# Patient Record
Sex: MALE | Race: WHITE | NOT HISPANIC OR LATINO
[De-identification: names, ages, dates, MRNs, and addresses within clinical notes are randomized per-mention and may not be internally consistent; named-entity substitution may affect disease eponyms.]

---

## 2017-03-21 PROBLEM — Z87.898 H/O URINARY FREQUENCY: Status: RESOLVED | Noted: 2017-03-21 | Resolved: 2017-03-21

## 2017-03-21 PROBLEM — R06.09 DOE (DYSPNEA ON EXERTION): Status: RESOLVED | Noted: 2017-03-21 | Resolved: 2017-03-21

## 2017-03-21 PROBLEM — Z87.39 HISTORY OF BACKACHE: Status: RESOLVED | Noted: 2017-03-21 | Resolved: 2017-03-21

## 2017-03-27 ENCOUNTER — NON-APPOINTMENT (OUTPATIENT)
Age: 67
End: 2017-03-27

## 2017-03-27 ENCOUNTER — APPOINTMENT (OUTPATIENT)
Dept: INTERNAL MEDICINE | Facility: CLINIC | Age: 67
End: 2017-03-27

## 2017-03-27 VITALS — HEIGHT: 69.5 IN | WEIGHT: 200 LBS | BODY MASS INDEX: 28.96 KG/M2

## 2017-03-27 DIAGNOSIS — Z87.39 PERSONAL HISTORY OF OTHER DISEASES OF THE MUSCULOSKELETAL SYSTEM AND CONNECTIVE TISSUE: ICD-10-CM

## 2017-03-27 DIAGNOSIS — R06.09 OTHER FORMS OF DYSPNEA: ICD-10-CM

## 2017-03-27 DIAGNOSIS — Z87.898 PERSONAL HISTORY OF OTHER SPECIFIED CONDITIONS: ICD-10-CM

## 2017-03-27 DIAGNOSIS — Z78.9 OTHER SPECIFIED HEALTH STATUS: ICD-10-CM

## 2017-03-27 LAB
BILIRUB UR QL STRIP: NEGATIVE
CLARITY UR: CLEAR
COLLECTION METHOD: NORMAL
GLUCOSE UR-MCNC: NEGATIVE
HCG UR QL: 0.2 EU/DL
HGB UR QL STRIP.AUTO: NEGATIVE
KETONES UR-MCNC: NEGATIVE
LEUKOCYTE ESTERASE UR QL STRIP: NEGATIVE
NITRITE UR QL STRIP: NEGATIVE
PH UR STRIP: 5.5
PROT UR STRIP-MCNC: NEGATIVE
SP GR UR STRIP: 1.02

## 2018-01-17 ENCOUNTER — APPOINTMENT (OUTPATIENT)
Dept: SURGERY | Facility: CLINIC | Age: 68
End: 2018-01-17
Payer: COMMERCIAL

## 2018-01-17 VITALS
HEART RATE: 64 BPM | SYSTOLIC BLOOD PRESSURE: 143 MMHG | HEIGHT: 69.5 IN | TEMPERATURE: 97.5 F | BODY MASS INDEX: 28.96 KG/M2 | WEIGHT: 200 LBS | DIASTOLIC BLOOD PRESSURE: 86 MMHG

## 2018-01-17 PROCEDURE — 99212 OFFICE O/P EST SF 10 MIN: CPT

## 2018-02-07 ENCOUNTER — INPATIENT (INPATIENT)
Facility: HOSPITAL | Age: 68
LOS: 0 days | Discharge: HOME | End: 2018-02-08
Attending: SURGERY | Admitting: SURGERY
Payer: COMMERCIAL

## 2018-02-07 VITALS
RESPIRATION RATE: 18 BRPM | HEIGHT: 70 IN | OXYGEN SATURATION: 100 % | SYSTOLIC BLOOD PRESSURE: 133 MMHG | DIASTOLIC BLOOD PRESSURE: 83 MMHG | TEMPERATURE: 98 F | HEART RATE: 81 BPM | WEIGHT: 195.11 LBS

## 2018-02-07 DIAGNOSIS — Z98.89 OTHER SPECIFIED POSTPROCEDURAL STATES: Chronic | ICD-10-CM

## 2018-02-07 NOTE — ED ADULT TRIAGE NOTE - CHIEF COMPLAINT QUOTE
Pt c/o right sided abdominal pain. Pt states he is being sent from Dr. Murphy office, diagnosed with a right inguinal incarcerated hernia. Pt states he is due for surgery in the morning. Denies n/v.

## 2018-02-08 ENCOUNTER — TRANSCRIPTION ENCOUNTER (OUTPATIENT)
Age: 68
End: 2018-02-08

## 2018-02-08 VITALS
OXYGEN SATURATION: 98 % | HEART RATE: 89 BPM | RESPIRATION RATE: 16 BRPM | DIASTOLIC BLOOD PRESSURE: 64 MMHG | SYSTOLIC BLOOD PRESSURE: 120 MMHG

## 2018-02-08 DIAGNOSIS — K40.90 UNILATERAL INGUINAL HERNIA, WITHOUT OBSTRUCTION OR GANGRENE, NOT SPECIFIED AS RECURRENT: ICD-10-CM

## 2018-02-08 LAB
ALBUMIN SERPL ELPH-MCNC: 4.1 G/DL — SIGNIFICANT CHANGE UP (ref 3.3–5)
ALP SERPL-CCNC: 66 U/L — SIGNIFICANT CHANGE UP (ref 40–120)
ALT FLD-CCNC: 41 U/L — SIGNIFICANT CHANGE UP (ref 4–41)
APTT BLD: 32.9 SEC — SIGNIFICANT CHANGE UP (ref 27.5–37.4)
AST SERPL-CCNC: 38 U/L — SIGNIFICANT CHANGE UP (ref 4–40)
BASOPHILS # BLD AUTO: 0.04 K/UL — SIGNIFICANT CHANGE UP (ref 0–0.2)
BASOPHILS NFR BLD AUTO: 0.5 % — SIGNIFICANT CHANGE UP (ref 0–2)
BILIRUB SERPL-MCNC: 0.8 MG/DL — SIGNIFICANT CHANGE UP (ref 0.2–1.2)
BLD GP AB SCN SERPL QL: NEGATIVE — SIGNIFICANT CHANGE UP
BUN SERPL-MCNC: 17 MG/DL — SIGNIFICANT CHANGE UP (ref 7–23)
CALCIUM SERPL-MCNC: 9.2 MG/DL — SIGNIFICANT CHANGE UP (ref 8.4–10.5)
CHLORIDE SERPL-SCNC: 105 MMOL/L — SIGNIFICANT CHANGE UP (ref 98–107)
CO2 SERPL-SCNC: 30 MMOL/L — SIGNIFICANT CHANGE UP (ref 22–31)
CREAT SERPL-MCNC: 0.97 MG/DL — SIGNIFICANT CHANGE UP (ref 0.5–1.3)
EOSINOPHIL # BLD AUTO: 0.13 K/UL — SIGNIFICANT CHANGE UP (ref 0–0.5)
EOSINOPHIL NFR BLD AUTO: 1.6 % — SIGNIFICANT CHANGE UP (ref 0–6)
GLUCOSE SERPL-MCNC: 84 MG/DL — SIGNIFICANT CHANGE UP (ref 70–99)
HCT VFR BLD CALC: 41.1 % — SIGNIFICANT CHANGE UP (ref 39–50)
HGB BLD-MCNC: 12.7 G/DL — LOW (ref 13–17)
IMM GRANULOCYTES # BLD AUTO: 0.03 # — SIGNIFICANT CHANGE UP
IMM GRANULOCYTES NFR BLD AUTO: 0.4 % — SIGNIFICANT CHANGE UP (ref 0–1.5)
INR BLD: 1.26 — HIGH (ref 0.88–1.17)
LYMPHOCYTES # BLD AUTO: 2.05 K/UL — SIGNIFICANT CHANGE UP (ref 1–3.3)
LYMPHOCYTES # BLD AUTO: 25.6 % — SIGNIFICANT CHANGE UP (ref 13–44)
MCHC RBC-ENTMCNC: 26.7 PG — LOW (ref 27–34)
MCHC RBC-ENTMCNC: 30.9 % — LOW (ref 32–36)
MCV RBC AUTO: 86.5 FL — SIGNIFICANT CHANGE UP (ref 80–100)
MONOCYTES # BLD AUTO: 0.65 K/UL — SIGNIFICANT CHANGE UP (ref 0–0.9)
MONOCYTES NFR BLD AUTO: 8.1 % — SIGNIFICANT CHANGE UP (ref 2–14)
NEUTROPHILS # BLD AUTO: 5.1 K/UL — SIGNIFICANT CHANGE UP (ref 1.8–7.4)
NEUTROPHILS NFR BLD AUTO: 63.8 % — SIGNIFICANT CHANGE UP (ref 43–77)
NRBC # FLD: 0 — SIGNIFICANT CHANGE UP
PLATELET # BLD AUTO: 148 K/UL — LOW (ref 150–400)
PMV BLD: 13.4 FL — HIGH (ref 7–13)
POTASSIUM SERPL-MCNC: 4.4 MMOL/L — SIGNIFICANT CHANGE UP (ref 3.5–5.3)
POTASSIUM SERPL-SCNC: 4.4 MMOL/L — SIGNIFICANT CHANGE UP (ref 3.5–5.3)
PROT SERPL-MCNC: 6.7 G/DL — SIGNIFICANT CHANGE UP (ref 6–8.3)
PROTHROM AB SERPL-ACNC: 14 SEC — HIGH (ref 9.8–13.1)
RBC # BLD: 4.75 M/UL — SIGNIFICANT CHANGE UP (ref 4.2–5.8)
RBC # FLD: 13.9 % — SIGNIFICANT CHANGE UP (ref 10.3–14.5)
RH IG SCN BLD-IMP: POSITIVE — SIGNIFICANT CHANGE UP
RH IG SCN BLD-IMP: POSITIVE — SIGNIFICANT CHANGE UP
SODIUM SERPL-SCNC: 144 MMOL/L — SIGNIFICANT CHANGE UP (ref 135–145)
WBC # BLD: 8 K/UL — SIGNIFICANT CHANGE UP (ref 3.8–10.5)
WBC # FLD AUTO: 8 K/UL — SIGNIFICANT CHANGE UP (ref 3.8–10.5)

## 2018-02-08 PROCEDURE — 71046 X-RAY EXAM CHEST 2 VIEWS: CPT | Mod: 26

## 2018-02-08 RX ORDER — ASPIRIN/CALCIUM CARB/MAGNESIUM 324 MG
81 TABLET ORAL DAILY
Qty: 0 | Refills: 0 | Status: DISCONTINUED | OUTPATIENT
Start: 2018-02-08 | End: 2018-02-08

## 2018-02-08 RX ORDER — DOCUSATE SODIUM 100 MG
100 CAPSULE ORAL THREE TIMES A DAY
Qty: 0 | Refills: 0 | Status: DISCONTINUED | OUTPATIENT
Start: 2018-02-08 | End: 2018-02-08

## 2018-02-08 RX ORDER — HYDROMORPHONE HYDROCHLORIDE 2 MG/ML
0.5 INJECTION INTRAMUSCULAR; INTRAVENOUS; SUBCUTANEOUS
Qty: 0 | Refills: 0 | Status: DISCONTINUED | OUTPATIENT
Start: 2018-02-08 | End: 2018-02-08

## 2018-02-08 RX ORDER — LEVOTHYROXINE SODIUM 125 MCG
50 TABLET ORAL DAILY
Qty: 0 | Refills: 0 | Status: DISCONTINUED | OUTPATIENT
Start: 2018-02-08 | End: 2018-02-08

## 2018-02-08 RX ORDER — DOCUSATE SODIUM 100 MG
1 CAPSULE ORAL
Qty: 0 | Refills: 0 | DISCHARGE
Start: 2018-02-08

## 2018-02-08 RX ORDER — ATORVASTATIN CALCIUM 80 MG/1
40 TABLET, FILM COATED ORAL AT BEDTIME
Qty: 0 | Refills: 0 | Status: DISCONTINUED | OUTPATIENT
Start: 2018-02-08 | End: 2018-02-08

## 2018-02-08 RX ORDER — HEPARIN SODIUM 5000 [USP'U]/ML
5000 INJECTION INTRAVENOUS; SUBCUTANEOUS EVERY 8 HOURS
Qty: 0 | Refills: 0 | Status: DISCONTINUED | OUTPATIENT
Start: 2018-02-08 | End: 2018-02-08

## 2018-02-08 RX ORDER — ACETAMINOPHEN 500 MG
2 TABLET ORAL
Qty: 0 | Refills: 0 | DISCHARGE
Start: 2018-02-08

## 2018-02-08 RX ORDER — ONDANSETRON 8 MG/1
4 TABLET, FILM COATED ORAL ONCE
Qty: 0 | Refills: 0 | Status: DISCONTINUED | OUTPATIENT
Start: 2018-02-08 | End: 2018-02-08

## 2018-02-08 RX ORDER — TAMSULOSIN HYDROCHLORIDE 0.4 MG/1
0.4 CAPSULE ORAL AT BEDTIME
Qty: 0 | Refills: 0 | Status: DISCONTINUED | OUTPATIENT
Start: 2018-02-08 | End: 2018-02-08

## 2018-02-08 RX ORDER — PREGABALIN 225 MG/1
1000 CAPSULE ORAL DAILY
Qty: 0 | Refills: 0 | Status: DISCONTINUED | OUTPATIENT
Start: 2018-02-08 | End: 2018-02-08

## 2018-02-08 RX ORDER — OXYCODONE AND ACETAMINOPHEN 5; 325 MG/1; MG/1
1 TABLET ORAL
Qty: 30 | Refills: 0
Start: 2018-02-08

## 2018-02-08 RX ORDER — SODIUM CHLORIDE 9 MG/ML
1000 INJECTION, SOLUTION INTRAVENOUS
Qty: 0 | Refills: 0 | Status: DISCONTINUED | OUTPATIENT
Start: 2018-02-08 | End: 2018-02-08

## 2018-02-08 RX ORDER — ACETAMINOPHEN 500 MG
650 TABLET ORAL EVERY 6 HOURS
Qty: 0 | Refills: 0 | Status: DISCONTINUED | OUTPATIENT
Start: 2018-02-08 | End: 2018-02-08

## 2018-02-08 RX ADMIN — Medication 100 MILLIGRAM(S): at 06:25

## 2018-02-08 RX ADMIN — PREGABALIN 1000 MICROGRAM(S): 225 CAPSULE ORAL at 07:44

## 2018-02-08 RX ADMIN — HEPARIN SODIUM 5000 UNIT(S): 5000 INJECTION INTRAVENOUS; SUBCUTANEOUS at 06:25

## 2018-02-08 RX ADMIN — SODIUM CHLORIDE 125 MILLILITER(S): 9 INJECTION, SOLUTION INTRAVENOUS at 02:50

## 2018-02-08 RX ADMIN — Medication 50 MICROGRAM(S): at 06:25

## 2018-02-08 RX ADMIN — HYDROMORPHONE HYDROCHLORIDE 0.5 MILLIGRAM(S): 2 INJECTION INTRAMUSCULAR; INTRAVENOUS; SUBCUTANEOUS at 10:07

## 2018-02-08 NOTE — ASU DISCHARGE PLAN (ADULT/PEDIATRIC). - NURSING INSTRUCTIONS
DO NOT take any Tylenol (Acetaminophen) or narcotics containing Tylenol until after 2:30 pm. You received Tylenol during your operation and it can cause damage to your liver if too much is taken within a 24 hour time period.     No creams, lotions, powders  or ointments to incision site. Do not scrub incision while showering. Pat dry after shower.

## 2018-02-08 NOTE — H&P ADULT - NSHPLABSRESULTS_GEN_ALL_CORE
12.7   8.00  )-----------( 148      ( 08 Feb 2018 00:05 )             41.1                 PT/INR - ( 08 Feb 2018 00:05 )   PT: 14.0 SEC;   INR: 1.26          PTT - ( 08 Feb 2018 00:05 )  PTT:32.9 SEC    02-08    144  |  105  |  17  ----------------------------<  84  4.4   |  30  |  0.97    Ca    9.2      08 Feb 2018 00:05    TPro  6.7  /  Alb  4.1  /  TBili  0.8  /  DBili  x   /  AST  38  /  ALT  41  /  AlkPhos  66  02-08      LIVER FUNCTIONS - ( 08 Feb 2018 00:05 )  Alb: 4.1 g/dL / Pro: 6.7 g/dL / ALK PHOS: 66 u/L / ALT: 41 u/L / AST: 38 u/L / GGT: x                 IMAGING  < from: Xray Chest 2 Views PA/Lat (02.08.18 @ 00:53) >    INTERPRETATION:  no emergent findings    < end of copied text >    EKG: WNL, no acute findings

## 2018-02-08 NOTE — BRIEF OPERATIVE NOTE - PROCEDURE
<<-----Click on this checkbox to enter Procedure Right hydrocelectomy in adult  02/08/2018    Active  DLOMASNEY  Right inguinal hernia repair  02/08/2018    Active  DLOMASNEY

## 2018-02-08 NOTE — H&P ADULT - PMH
BPH (benign prostatic hypertrophy)    CAD (coronary artery disease)    GERD (gastroesophageal reflux disease)    HTN (hypertension)    Hypothyroidism    Leg edema    Pericarditis    Peripheral neuropathy    Pituitary adenoma  treated with meds  Prostatism    Stented coronary artery  2015  Varicose vein    Venous insufficiency

## 2018-02-08 NOTE — ED PROVIDER NOTE - MEDICAL DECISION MAKING DETAILS
67M PMH CAD (stent x2, off asa x2w), HTN, R inguinal hernia x2yrs p/w R groin pain, now resolved after  getting reduced by Dr. Murphy, sent for fruther surgical management. Currently asymptomatic. Vitals wnl, exam as above.  ddx: Hernia, clinically not strangulated.  Surgery already at bedside prior to my eval. Requesting pre-op labs, CXR/EKG and admission for likely surgical management in the morning.

## 2018-02-08 NOTE — ED ADULT NURSE NOTE - ED STAT RN HANDOFF DETAILS
Patient is Armen&Ox4, aware of plan of care and in NAD, and has ESSU assignment available.  Report given CLAY Dubon in ESSU 1.  Patient awaiting transportation.  Will continue to monitor patient closely. TANYA Barnes R.N.

## 2018-02-08 NOTE — H&P ADULT - NSHPPHYSICALEXAM_GEN_ALL_CORE
Vital Signs Last 24 Hrs  T(C): 36.9 (08 Feb 2018 01:51), Max: 36.9 (08 Feb 2018 01:51)  T(F): 98.4 (08 Feb 2018 01:51), Max: 98.4 (08 Feb 2018 01:51)  HR: 67 (08 Feb 2018 01:51) (67 - 81)  BP: 151/90 (08 Feb 2018 01:51) (133/83 - 151/90)  BP(mean): --  RR: 18 (08 Feb 2018 01:51) (18 - 18)  SpO2: 100% (08 Feb 2018 01:51) (100% - 100%)    GEN: NAD, alert and oriented x 3  HEENT: WNL  CHEST: Symmetrical chest rise, breath sounds CTAB  HEART: RRR, non-muffled heart sounds  ABD: Soft, non-tender, non-distended. R groin mass - overlying skin is without erythema/edema, non-tender, mass is soft and non-tender, and reduces proximally into inguinal canal. Testes are descended, scrotum is soft/non-tender/no erythema.  EXT: No erythema/edema. Warm, sensate, motor function intact

## 2018-02-08 NOTE — ED PROVIDER NOTE - OBJECTIVE STATEMENT
67M PMH CAD (stent x2, off asa x2w), HTN p/w R groin pain. Has hx of R inguinal hernia x2yrs, pain worsened today, went to Dr Francisco who reduced the hernia and send to ED for further eval. Pt now pain free. +Able to pass gas. Last BM in the morning. Denies f/c, SOB/CP, NVD, urinary complaints, black/bloody stool.

## 2018-02-08 NOTE — ASU DISCHARGE PLAN (ADULT/PEDIATRIC). - SPECIAL INSTRUCTIONS
- Pt should hold Effient until tomorrow  - Wear supportive underwear  - Ice for swelling 3x/ day for 15 minutes x 3 days  - Pt can expect to have bruising of the scrotum and penis, as well as possible swelling

## 2018-02-08 NOTE — ASU DISCHARGE PLAN (ADULT/PEDIATRIC). - ASU FOLLOWUP
911 or go to the nearest Emergency Room [facility] (Adult):/may also call Recovery Room (PACU) 24/7 @ [phone]

## 2018-02-08 NOTE — BRIEF OPERATIVE NOTE - PRE-OP DX
Hydrocele in adult  02/08/2018    Active  Felipe Hampton  Right inguinal hernia  02/08/2018    Active  Felipe Hampton

## 2018-02-08 NOTE — ASU DISCHARGE PLAN (ADULT/PEDIATRIC). - INSTRUCTIONS
Keep first meal light. Nothing fried, spicy or greasy. Increase fluids.  Progress to regular diet as tolerated.  Keep well hydrated. Call MD office to schedule follow up appointment

## 2018-02-08 NOTE — BRIEF OPERATIVE NOTE - CONDITION POST OP
Pt stopped at desk- pt wants to cancel appt for procedure scheduled 11/9- pt st he does not want to reschedule Stable

## 2018-02-08 NOTE — ASU DISCHARGE PLAN (ADULT/PEDIATRIC). - PAIN
[facility], Narcotic pain medication may cause nausea or constipation. Take medication with food. Increase fluids and fiber intake.

## 2018-02-08 NOTE — ED ADULT NURSE NOTE - OBJECTIVE STATEMENT
Received pt to spot 25 A&Ox4 sent in by PCP r/t RLQ pain as per pt has incarcerated hernia, PCP reduced as much as possible in office but scheduled for OR in the morning, denies N/V, denies wanting medication for pain. Iv placed, labs sent, VS as documented, will reassess.

## 2018-02-08 NOTE — H&P ADULT - HISTORY OF PRESENT ILLNESS
67y male, presenting with Right inguinal hernia which was incarcerated for approximately 12 hours on the day of admission, and was reduced in the office by the patient's surgeon (Dr. Murphy). Patient is presenting for admission for observation and 67y male, presenting with Right inguinal hernia which was incarcerated for approximately 12 hours on the day of admission, and was reduced in the office by the patient's surgeon (Dr. Murphy). Patient is presenting for admission for observation and operative repair of his right inguinal hernia. He denies fever/chills, denies nausea/vomiting, reports passage of flatus and stool. He denies groin or abdominal pain at present.   Patient has had this right inguinal hernia for roughly two years, and it has been slowly growing and his intermittent right groin pain has been slowly worsening. The hernia has never resulted in obstructive symptoms. Of note, he was in discussion to have the hernia repaired with another surgeon (prior to seeing Dr. Murphy), but his operative repair was delayed because he had recently undergone PCI with placement of JORDAN x 2 (placed separately at two cath sessions for CAD). He was on Effient (which could not be stopped), and so his surgery was delayed. He is off Effient now. He does remain on ASA 81 daily - however this was held for a recent colonoscopy. Patient has not taken ASA in roughly two weeks. Last PO was a light dinner at 17:00 on the day of admission.

## 2018-02-08 NOTE — ED PROVIDER NOTE - PHYSICAL EXAMINATION
PCA juliet chaperone: +R inguinal hernia, mostly reducible, no overlying skin changes or ttp  no LE edema, normal equal distal pulses.   no CVAT

## 2018-02-08 NOTE — H&P ADULT - PROBLEM SELECTOR PLAN 1
- Admit to Surgery for observation and operative repair  - NPO after midnight, IVF  - Pre-operative risk assessment by IM/Cardiology (Dr. Rocky Dos Santos requested to see patient)  - DVT chemo- and mechanical prophylaxis  - PRN pain control  - Serial abdominal examinations

## 2018-02-08 NOTE — ASU DISCHARGE PLAN (ADULT/PEDIATRIC). - NOTIFY
Increased Irritability or Sluggishness/Pain not relieved by Medications/Fever greater than 101/Unable to Urinate/Persistent Nausea and Vomiting/Bleeding that does not stop Increased Irritability or Sluggishness/red hot irritated skin or pussy drainage from incision/Unable to Urinate/Pain not relieved by Medications/Inability to Tolerate Liquids or Foods/Bleeding that does not stop/Fever greater than 101/Numbness, color, or temperature change to extremity/Persistent Nausea and Vomiting

## 2018-02-23 ENCOUNTER — APPOINTMENT (OUTPATIENT)
Dept: SURGERY | Facility: AMBULATORY SURGERY CENTER | Age: 68
End: 2018-02-23

## 2018-03-05 DIAGNOSIS — Z86.2 PERSONAL HISTORY OF DISEASES OF THE BLOOD AND BLOOD-FORMING ORGANS AND CERTAIN DISORDERS INVOLVING THE IMMUNE MECHANISM: ICD-10-CM

## 2018-03-05 DIAGNOSIS — Z87.898 PERSONAL HISTORY OF OTHER SPECIFIED CONDITIONS: ICD-10-CM

## 2018-04-05 PROBLEM — Z87.898 HISTORY OF EXERTIONAL CHEST PAIN: Status: RESOLVED | Noted: 2018-04-05 | Resolved: 2018-04-05

## 2018-04-05 PROBLEM — Z86.2 HISTORY OF THROMBOCYTOPENIA: Status: RESOLVED | Noted: 2018-04-05 | Resolved: 2018-04-05

## 2018-04-23 ENCOUNTER — APPOINTMENT (OUTPATIENT)
Dept: INTERNAL MEDICINE | Facility: CLINIC | Age: 68
End: 2018-04-23
Payer: COMMERCIAL

## 2018-04-23 ENCOUNTER — MEDICATION RENEWAL (OUTPATIENT)
Age: 68
End: 2018-04-23

## 2018-04-23 ENCOUNTER — NON-APPOINTMENT (OUTPATIENT)
Age: 68
End: 2018-04-23

## 2018-04-23 ENCOUNTER — LABORATORY RESULT (OUTPATIENT)
Age: 68
End: 2018-04-23

## 2018-04-23 VITALS
HEIGHT: 69.5 IN | SYSTOLIC BLOOD PRESSURE: 120 MMHG | DIASTOLIC BLOOD PRESSURE: 84 MMHG | BODY MASS INDEX: 29.1 KG/M2 | WEIGHT: 201 LBS

## 2018-04-23 VITALS — SYSTOLIC BLOOD PRESSURE: 118 MMHG | DIASTOLIC BLOOD PRESSURE: 78 MMHG

## 2018-04-23 DIAGNOSIS — K40.90 UNILATERAL INGUINAL HERNIA, W/OUT OBSTRUCTION OR GANGRENE, NOT SPECIFIED AS RECURRENT: ICD-10-CM

## 2018-04-23 LAB
BILIRUB UR QL STRIP: NORMAL
CLARITY UR: CLEAR
COLLECTION METHOD: NORMAL
GLUCOSE UR-MCNC: NORMAL
HCG UR QL: 0.2 EU/DL
HGB UR QL STRIP.AUTO: NORMAL
KETONES UR-MCNC: NORMAL
LEUKOCYTE ESTERASE UR QL STRIP: NORMAL
NITRITE UR QL STRIP: NORMAL
PH UR STRIP: 5.5
PROT UR STRIP-MCNC: NORMAL
SP GR UR STRIP: 1.02

## 2018-04-23 PROCEDURE — 36415 COLL VENOUS BLD VENIPUNCTURE: CPT

## 2018-04-23 PROCEDURE — 93000 ELECTROCARDIOGRAM COMPLETE: CPT

## 2018-04-23 PROCEDURE — 99397 PER PM REEVAL EST PAT 65+ YR: CPT | Mod: 25

## 2018-04-23 PROCEDURE — 81003 URINALYSIS AUTO W/O SCOPE: CPT | Mod: QW

## 2018-04-24 LAB
25(OH)D3 SERPL-MCNC: 38.5 NG/ML
ALBUMIN SERPL ELPH-MCNC: 3.7 G/DL
ALP BLD-CCNC: 75 U/L
ALT SERPL-CCNC: 26 U/L
ANION GAP SERPL CALC-SCNC: 13 MMOL/L
AST SERPL-CCNC: 28 U/L
BASOPHILS # BLD AUTO: 0.02 K/UL
BASOPHILS NFR BLD AUTO: 0.4 %
BILIRUB SERPL-MCNC: 0.6 MG/DL
BUN SERPL-MCNC: 19 MG/DL
CALCIUM SERPL-MCNC: 9.4 MG/DL
CHLORIDE SERPL-SCNC: 104 MMOL/L
CHOLEST SERPL-MCNC: 106 MG/DL
CHOLEST/HDLC SERPL: 2.1 RATIO
CO2 SERPL-SCNC: 25 MMOL/L
CREAT SERPL-MCNC: 1.22 MG/DL
EOSINOPHIL # BLD AUTO: 0.08 K/UL
EOSINOPHIL NFR BLD AUTO: 1.5 %
GLUCOSE SERPL-MCNC: 63 MG/DL
HBA1C MFR BLD HPLC: 5.1 %
HCT VFR BLD CALC: 37 %
HDLC SERPL-MCNC: 50 MG/DL
HGB BLD-MCNC: 11.4 G/DL
IMM GRANULOCYTES NFR BLD AUTO: 0 %
LDLC SERPL CALC-MCNC: 50 MG/DL
LYMPHOCYTES # BLD AUTO: 1.62 K/UL
LYMPHOCYTES NFR BLD AUTO: 29.5 %
MAN DIFF?: NORMAL
MCHC RBC-ENTMCNC: 26.1 PG
MCHC RBC-ENTMCNC: 30.8 GM/DL
MCV RBC AUTO: 84.7 FL
MONOCYTES # BLD AUTO: 0.37 K/UL
MONOCYTES NFR BLD AUTO: 6.7 %
NEUTROPHILS # BLD AUTO: 3.4 K/UL
NEUTROPHILS NFR BLD AUTO: 61.9 %
PLATELET # BLD AUTO: 181 K/UL
POTASSIUM SERPL-SCNC: 4.4 MMOL/L
PROT SERPL-MCNC: 7 G/DL
RBC # BLD: 4.37 M/UL
RBC # FLD: 14.5 %
SAVE SPECIMEN: NORMAL
SODIUM SERPL-SCNC: 142 MMOL/L
T3RU NFR SERPL: 1.04 INDEX
T4 SERPL-MCNC: 7.1 UG/DL
TRIGL SERPL-MCNC: 29 MG/DL
TSH SERPL-ACNC: 2.89 UIU/ML
URATE SERPL-MCNC: 3.8 MG/DL
WBC # FLD AUTO: 5.49 K/UL

## 2019-06-03 ENCOUNTER — APPOINTMENT (OUTPATIENT)
Dept: INTERNAL MEDICINE | Facility: CLINIC | Age: 69
End: 2019-06-03
Payer: COMMERCIAL

## 2019-06-03 ENCOUNTER — NON-APPOINTMENT (OUTPATIENT)
Age: 69
End: 2019-06-03

## 2019-06-03 VITALS
DIASTOLIC BLOOD PRESSURE: 70 MMHG | SYSTOLIC BLOOD PRESSURE: 120 MMHG | HEIGHT: 69.25 IN | BODY MASS INDEX: 26.65 KG/M2 | WEIGHT: 182 LBS

## 2019-06-03 VITALS — DIASTOLIC BLOOD PRESSURE: 74 MMHG | SYSTOLIC BLOOD PRESSURE: 120 MMHG

## 2019-06-03 DIAGNOSIS — R05 COUGH: ICD-10-CM

## 2019-06-03 DIAGNOSIS — R79.89 OTHER SPECIFIED ABNORMAL FINDINGS OF BLOOD CHEMISTRY: ICD-10-CM

## 2019-06-03 LAB
BILIRUB UR QL STRIP: NORMAL
CLARITY UR: CLEAR
COLLECTION METHOD: NORMAL
GLUCOSE UR-MCNC: NORMAL
HCG UR QL: 0.2 EU/DL
HGB UR QL STRIP.AUTO: NORMAL
KETONES UR-MCNC: NORMAL
LEUKOCYTE ESTERASE UR QL STRIP: NORMAL
NITRITE UR QL STRIP: NORMAL
PH UR STRIP: 5.5
PROT UR STRIP-MCNC: NORMAL
SP GR UR STRIP: 1.03

## 2019-06-03 PROCEDURE — 81003 URINALYSIS AUTO W/O SCOPE: CPT | Mod: QW

## 2019-06-03 PROCEDURE — 99397 PER PM REEVAL EST PAT 65+ YR: CPT | Mod: 25

## 2019-06-03 PROCEDURE — 93000 ELECTROCARDIOGRAM COMPLETE: CPT

## 2019-06-03 NOTE — REVIEW OF SYSTEMS
[Chest Pain] : chest pain [Fatigue] : fatigue [Nocturia] : nocturia [Joint Pain] : joint pain [Negative] : Heme/Lymph [de-identified] : rash [de-identified] : neuropathy

## 2019-06-03 NOTE — HISTORY OF PRESENT ILLNESS
[FreeTextEntry1] : This is a 69-year-old male for annual health assessment [de-identified] : Specifically we will address his history of ASHD hypertension hypothyroidism peripheral neuropathy and pituitary adenoma. In addition he has a history of tinnitus\par \par Patient has chest pain on exertion which is unchanged over the year\par \par he continues to have dysesthesias of his hands and feet which have not increased\par \par He has been complaining of fatigue he lamberto a testosterone level on himself and it is low. That is free testosterone is low total is normal but he does have elevation of gonadotropins.\par \par He has pain in his left knee.

## 2019-06-03 NOTE — ASSESSMENT
[FreeTextEntry1] : This is a 69-year-old male whose history has been reviewed above\par \par He has a history of hypertension his blood pressure is under excellent control on the current regime. He has lost 10 pounds. He has no orthostasis no medication changes\par \par He remains on atorvastatin for cardioprotection. Cholesterol profile has been obtained. His LDL is 53 no change\par \par He does have a history of hypothyroidism he remains on Synthroid his TSH is normal no intervention\par \par He does have nocturia but apparently his prostate is not enlarged as per his urologist he remains on Flomax.\par \par He is having difficulty with fatigue and sexual performance. His testosterone is low. I have no objections to starting a trial of testosterone.\par \par In terms of his neuropathy this seems to have stabilized he has been worked up appropriately\par \par He is up-to-date with colonoscopy\par \par He is up-to-date with vaccinations he will get the shingle shot\par \par In terms of his knee I asked him to obtain a Lyme's titer.\par \par We will have him seen by orthopedics\par \par In addition we will have him seen by dermatology\par \par Terms of his ASHD he is stable. He is followed by cardiology I see no need for intervention\par \par For completeness it should be noted that although he had 2 stents there was no major vessel disease His EKG was normal\par \par He is attributing his mildly low hemoglobin which has been intermittent 2 his testosterone which is reasonable but I think he should get at least a serologic workup\par \par In terms of his cough we will get a chest x-ray but I think he should start Flonase as this may be postnasal this point\par \par

## 2019-06-03 NOTE — PHYSICAL EXAM
[Well Nourished] : well nourished [No Acute Distress] : no acute distress [Well Developed] : well developed [Well-Appearing] : well-appearing [PERRL] : pupils equal round and reactive to light [Normal Sclera/Conjunctiva] : normal sclera/conjunctiva [EOMI] : extraocular movements intact [Normal Outer Ear/Nose] : the outer ears and nose were normal in appearance [Normal Oropharynx] : the oropharynx was normal [Supple] : supple [No JVD] : no jugular venous distention [Thyroid Normal, No Nodules] : the thyroid was normal and there were no nodules present [No Lymphadenopathy] : no lymphadenopathy [No Respiratory Distress] : no respiratory distress  [Clear to Auscultation] : lungs were clear to auscultation bilaterally [No Accessory Muscle Use] : no accessory muscle use [Regular Rhythm] : with a regular rhythm [Normal Rate] : normal rate  [Normal S1, S2] : normal S1 and S2 [No Murmur] : no murmur heard [No Carotid Bruits] : no carotid bruits [No Abdominal Bruit] : a ~M bruit was not heard ~T in the abdomen [Pedal Pulses Present] : the pedal pulses are present [No Varicosities] : no varicosities [No Extremity Clubbing/Cyanosis] : no extremity clubbing/cyanosis [No Edema] : there was no peripheral edema [No Palpable Aorta] : no palpable aorta [Soft] : abdomen soft [Non Tender] : non-tender [Non-distended] : non-distended [No Masses] : no abdominal mass palpated [No HSM] : no HSM [Normal Bowel Sounds] : normal bowel sounds [No Mass] : no mass [Normal Sphincter Tone] : normal sphincter tone [Normal Posterior Cervical Nodes] : no posterior cervical lymphadenopathy [Normal Anterior Cervical Nodes] : no anterior cervical lymphadenopathy [No CVA Tenderness] : no CVA  tenderness [No Spinal Tenderness] : no spinal tenderness [Grossly Normal Strength/Tone] : grossly normal strength/tone [Normal Gait] : normal gait [Deep Tendon Reflexes (DTR)] : deep tendon reflexes were 2+ and symmetric [Normal Affect] : the affect was normal [No Focal Deficits] : no focal deficits [Coordination Grossly Intact] : coordination grossly intact [Normal Insight/Judgement] : insight and judgment were intact [Stool Occult Blood] : stool negative for occult blood [de-identified] : diffusebut localized rash is [de-identified] : mildly swolleinful left knee [de-identified] : unchanged

## 2019-06-03 NOTE — HEALTH RISK ASSESSMENT
[Good] : ~his/her~  mood as  good [No falls in past year] : Patient reported no falls in the past year [0] : 1) Little interest or pleasure doing things: Not at all (0) [With Family] : lives with family [None] : None [Graduate School] : graduate school [] :  [Feels Safe at Home] : Feels safe at home [Fully functional (bathing, dressing, toileting, transferring, walking, feeding)] : Fully functional (bathing, dressing, toileting, transferring, walking, feeding) [Sexually Active] : sexually active [Fully functional (using the telephone, shopping, preparing meals, housekeeping, doing laundry, using] : Fully functional and needs no help or supervision to perform IADLs (using the telephone, shopping, preparing meals, housekeeping, doing laundry, using transportation, managing medications and managing finances) [Reports normal functional visual acuity (ie: able to read med bottle)] : Reports normal functional visual acuity [Carbon Monoxide Detector] : carbon monoxide detector [Smoke Detector] : smoke detector [Seat Belt] :  uses seat belt [Sunscreen] : uses sunscreen [I will adhere to the patient's wishes as expressed in the advance directive except as noted below.] : I will adhere to the patient's wishes as expressed in the advance directive except as noted below [Change in mental status noted] : No change in mental status noted [] : No [Reports changes in vision] : Reports no changes in vision [Reports changes in hearing] : Reports no changes in hearing [Guns at Home] : no guns at home [Reports changes in dental health] : Reports no changes in dental health [Safety elements used in home] : no safety elements used in home [Travel to Developing Areas] : does not  travel to developing areas [TB Exposure] : is not being exposed to tuberculosis [Caregiver Concerns] : does not have caregiver concerns [FreeTextEntry4] : Wife his health care proxy

## 2019-06-04 ENCOUNTER — TRANSCRIPTION ENCOUNTER (OUTPATIENT)
Age: 69
End: 2019-06-04

## 2019-06-20 ENCOUNTER — APPOINTMENT (OUTPATIENT)
Dept: ORTHOPEDIC SURGERY | Facility: CLINIC | Age: 69
End: 2019-06-20
Payer: COMMERCIAL

## 2019-06-20 VITALS
BODY MASS INDEX: 26.66 KG/M2 | DIASTOLIC BLOOD PRESSURE: 80 MMHG | SYSTOLIC BLOOD PRESSURE: 129 MMHG | HEART RATE: 68 BPM | WEIGHT: 180 LBS | HEIGHT: 69 IN

## 2019-06-20 DIAGNOSIS — M25.562 PAIN IN LEFT KNEE: ICD-10-CM

## 2019-06-20 PROCEDURE — 73562 X-RAY EXAM OF KNEE 3: CPT

## 2019-06-20 PROCEDURE — 99203 OFFICE O/P NEW LOW 30 MIN: CPT

## 2019-07-12 NOTE — ASU PREOP CHECKLIST - RESPIRATORY RATE (BREATHS/MIN)
16
Syncope    Syncope is when you temporarily lose consciousness, also called fainting or passing out. It is caused by a sudden decrease in blood flow to the brain. Even though most causes of syncope are not dangerous, syncope can possibly be a sign of a serious medical problem. Signs that you may be about to faint include feeling dizzy, lightheaded, nausea, visual changes, or cold/clammy skin. Do not drive, operate heavy machinery, or play sports until your health care provider says it is okay.    SEEK IMMEDIATE MEDICAL CARE IF YOU HAVE ANY OF THE FOLLOWING SYMPTOMS: severe headache, pain in your chest/abdomen/back, bleeding from your mouth or rectum, palpitations, shortness of breath, pain with breathing, seizure, confusion, or trouble walking.

## 2019-08-27 ENCOUNTER — LABORATORY RESULT (OUTPATIENT)
Age: 69
End: 2019-08-27

## 2019-09-04 LAB
ALBUMIN MFR SERPL ELPH: 58.9 %
ALBUMIN SERPL-MCNC: 3.7 G/DL
ALBUMIN/GLOB SERPL: 1.5 RATIO
ALPHA1 GLOB MFR SERPL ELPH: 5.2 %
ALPHA1 GLOB SERPL ELPH-MCNC: 0.3 G/DL
ALPHA2 GLOB MFR SERPL ELPH: 9.9 %
ALPHA2 GLOB SERPL ELPH-MCNC: 0.6 G/DL
B BURGDOR IGG+IGM SER QL IB: NORMAL
B-GLOBULIN MFR SERPL ELPH: 11.6 %
B-GLOBULIN SERPL ELPH-MCNC: 0.7 G/DL
FERRITIN SERPL-MCNC: 35 NG/ML
FOLATE SERPL-MCNC: >20 NG/ML
GAMMA GLOB FLD ELPH-MCNC: 0.9 G/DL
GAMMA GLOB MFR SERPL ELPH: 14.4 %
INTERPRETATION SERPL IEP-IMP: NORMAL
IRON SATN MFR SERPL: 21 %
IRON SERPL-MCNC: 74 UG/DL
M PROTEIN SPEC IFE-MCNC: NORMAL
PROT SERPL-MCNC: 6.2 G/DL
PROT SERPL-MCNC: 6.2 G/DL
SAVE SPECIMEN: NORMAL
TIBC SERPL-MCNC: 359 UG/DL
UIBC SERPL-MCNC: 285 UG/DL
VIT B12 SERPL-MCNC: >2000 PG/ML

## 2019-09-25 ENCOUNTER — APPOINTMENT (OUTPATIENT)
Dept: INTERNAL MEDICINE | Facility: CLINIC | Age: 69
End: 2019-09-25
Payer: COMMERCIAL

## 2019-09-25 PROCEDURE — 90471 IMMUNIZATION ADMIN: CPT

## 2019-09-25 PROCEDURE — 90750 HZV VACC RECOMBINANT IM: CPT

## 2019-09-26 ENCOUNTER — MED ADMIN CHARGE (OUTPATIENT)
Age: 69
End: 2019-09-26

## 2019-11-27 ENCOUNTER — APPOINTMENT (OUTPATIENT)
Dept: ORTHOPEDIC SURGERY | Facility: CLINIC | Age: 69
End: 2019-11-27
Payer: COMMERCIAL

## 2019-11-27 DIAGNOSIS — M17.12 UNILATERAL PRIMARY OSTEOARTHRITIS, LEFT KNEE: ICD-10-CM

## 2019-11-27 DIAGNOSIS — M25.562 PAIN IN LEFT KNEE: ICD-10-CM

## 2019-11-27 PROCEDURE — 99213 OFFICE O/P EST LOW 20 MIN: CPT

## 2019-12-18 ENCOUNTER — APPOINTMENT (OUTPATIENT)
Dept: INTERNAL MEDICINE | Facility: CLINIC | Age: 69
End: 2019-12-18
Payer: COMMERCIAL

## 2019-12-18 PROCEDURE — 90750 HZV VACC RECOMBINANT IM: CPT

## 2019-12-18 PROCEDURE — 90471 IMMUNIZATION ADMIN: CPT

## 2019-12-19 ENCOUNTER — MED ADMIN CHARGE (OUTPATIENT)
Age: 69
End: 2019-12-19

## 2020-03-30 ENCOUNTER — APPOINTMENT (OUTPATIENT)
Dept: INTERNAL MEDICINE | Facility: CLINIC | Age: 70
End: 2020-03-30

## 2020-05-04 ENCOUNTER — APPOINTMENT (OUTPATIENT)
Dept: DERMATOLOGY | Facility: CLINIC | Age: 70
End: 2020-05-04

## 2020-07-23 NOTE — ASU PREOP CHECKLIST - BP NONINVASIVE DIASTOLIC (MM HG)
90 Complex Repair And Split-Thickness Skin Graft Text: The defect edges were debeveled with a #15 scalpel blade.  The primary defect was closed partially with a complex linear closure.  Given the location of the defect, shape of the defect and the proximity to free margins a split thickness skin graft was deemed most appropriate to repair the remaining defect.  The graft was trimmed to fit the size of the remaining defect.  The graft was then placed in the primary defect, oriented appropriately, and sutured into place.

## 2020-08-24 ENCOUNTER — APPOINTMENT (OUTPATIENT)
Dept: INTERNAL MEDICINE | Facility: CLINIC | Age: 70
End: 2020-08-24
Payer: COMMERCIAL

## 2020-08-24 ENCOUNTER — NON-APPOINTMENT (OUTPATIENT)
Age: 70
End: 2020-08-24

## 2020-08-24 VITALS
DIASTOLIC BLOOD PRESSURE: 82 MMHG | WEIGHT: 183 LBS | SYSTOLIC BLOOD PRESSURE: 120 MMHG | BODY MASS INDEX: 27.11 KG/M2 | TEMPERATURE: 97.3 F | HEIGHT: 69 IN

## 2020-08-24 DIAGNOSIS — M85.80 OTHER SPECIFIED DISORDERS OF BONE DENSITY AND STRUCTURE, UNSPECIFIED SITE: ICD-10-CM

## 2020-08-24 PROCEDURE — 99387 INIT PM E/M NEW PAT 65+ YRS: CPT | Mod: 25

## 2020-08-24 PROCEDURE — 36415 COLL VENOUS BLD VENIPUNCTURE: CPT

## 2020-08-24 PROCEDURE — 93000 ELECTROCARDIOGRAM COMPLETE: CPT

## 2020-08-24 NOTE — HEALTH RISK ASSESSMENT
[Fair] : ~his/her~ current health as fair  [Good] : ~his/her~  mood as  good [Yes] : Yes [No falls in past year] : Patient reported no falls in the past year [0] : 1) Little interest or pleasure doing things: Not at all (0) [With Significant Other] : lives with significant other [None] : None [Graduate School] : graduate school [Sexually Active] : sexually active [] :  [Fully functional (using the telephone, shopping, preparing meals, housekeeping, doing laundry, using] : Fully functional and needs no help or supervision to perform IADLs (using the telephone, shopping, preparing meals, housekeeping, doing laundry, using transportation, managing medications and managing finances) [Fully functional (bathing, dressing, toileting, transferring, walking, feeding)] : Fully functional (bathing, dressing, toileting, transferring, walking, feeding) [Smoke Detector] : smoke detector [Seat Belt] :  uses seat belt [Sunscreen] : uses sunscreen [I will adhere to the patient's wishes as expressed in the advance directive except as noted below.] : I will adhere to the patient's wishes as expressed in the advance directive except as noted below [Change in mental status noted] : No change in mental status noted [Reports changes in hearing] : Reports no changes in hearing [Reports changes in vision] : Reports no changes in vision [Reports changes in dental health] : Reports no changes in dental health [Guns at Home] : no guns at home [Safety elements used in home] : no safety elements used in home [Travel to Developing Areas] : does not  travel to developing areas [TB Exposure] : is not being exposed to tuberculosis [Caregiver Concerns] : does not have caregiver concerns [FreeTextEntry4] : Wife as health care proxy

## 2020-08-24 NOTE — HISTORY OF PRESENT ILLNESS
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 His neuropathy seems to be progressing slowly he has some difficulty with fine motor tasks with the mildly tender in a handwriting\par \par He continues to have a rash on his abdomen which has not been evaluated at this time\par \par He has no cardiovascular complaints\par \par He does have chronic constipation\par \par He was noted to have mild iron deficiency anemia\par \par He has had no cardiovascular complaints\par \par He does have mild balance problems with no falls\par \par He was also noted to be osteopenic on a bone density done for loss of height

## 2020-08-24 NOTE — PHYSICAL EXAM
[No Acute Distress] : no acute distress [Well Nourished] : well nourished [Well Developed] : well developed [Well-Appearing] : well-appearing [Normal Sclera/Conjunctiva] : normal sclera/conjunctiva [PERRL] : pupils equal round and reactive to light [Normal Outer Ear/Nose] : the outer ears and nose were normal in appearance [EOMI] : extraocular movements intact [No JVD] : no jugular venous distention [Normal Oropharynx] : the oropharynx was normal [No Lymphadenopathy] : no lymphadenopathy [Supple] : supple [Thyroid Normal, No Nodules] : the thyroid was normal and there were no nodules present [No Respiratory Distress] : no respiratory distress  [No Accessory Muscle Use] : no accessory muscle use [Clear to Auscultation] : lungs were clear to auscultation bilaterally [Normal Rate] : normal rate  [Normal S1, S2] : normal S1 and S2 [Regular Rhythm] : with a regular rhythm [No Murmur] : no murmur heard [No Carotid Bruits] : no carotid bruits [No Abdominal Bruit] : a ~M bruit was not heard ~T in the abdomen [No Varicosities] : no varicosities [Pedal Pulses Present] : the pedal pulses are present [No Edema] : there was no peripheral edema [No Palpable Aorta] : no palpable aorta [No Extremity Clubbing/Cyanosis] : no extremity clubbing/cyanosis [Soft] : abdomen soft [Non-distended] : non-distended [Non Tender] : non-tender [No Masses] : no abdominal mass palpated [No HSM] : no HSM [Normal Bowel Sounds] : normal bowel sounds [Normal Anterior Cervical Nodes] : no anterior cervical lymphadenopathy [No CVA Tenderness] : no CVA  tenderness [Normal Posterior Cervical Nodes] : no posterior cervical lymphadenopathy [No Spinal Tenderness] : no spinal tenderness [No Joint Swelling] : no joint swelling [Grossly Normal Strength/Tone] : grossly normal strength/tone [No Focal Deficits] : no focal deficits [No Rash] : no rash [Normal Gait] : normal gait [Normal Insight/Judgement] : insight and judgment were intact [Normal Affect] : the affect was normal [Normal] : affect was normal and insight and judgment were intact [de-identified] : hydrocele [de-identified] : difficulty with heel-to-toe and with fine motor movement

## 2020-08-24 NOTE — REVIEW OF SYSTEMS
[Fatigue] : fatigue [Constipation] : constipation [Frequency] : frequency [Negative] : Heme/Lymph [FreeTextEntry4] : tinnitus [de-identified] : rash [de-identified] : history

## 2020-08-24 NOTE — ASSESSMENT
[FreeTextEntry1] : This is a 70-year-old male whose history has been reviewed above\par \par He has a history of ASHD he has pain-free. His EKG is normal his LDL is 45 he remains on aspirin no intervention\par \par He has a pituitary adenoma he has not visualized this in several years he will get an MRI\par \par He remains on a D 2 agonist his prolactin levels are low. Interestingly his FSH is high. He has intermittently low testosterone which may be responsible for his osteopenia. In addition his LH level is high\par \par He has seen numerous neurologists for his neuropathy with no diagnosis he did do heavy metals which were normal at this point we will send him for physical therapy for strengthening and coordination\par \par In terms of his anemia he should have a GI workup however she did have a recent colonoscopy and he is on aspirin hopefully this will be the source of his anemia. Of note his third level is normal\par \par He will be sent to dermatology for his rash\par \par I did ask him to get a hepatitis C titer\par \par He is up-to-date with loculations\par \par His TSH is normal as is his PSA\par \par

## 2021-01-19 NOTE — ASU DISCHARGE PLAN (ADULT/PEDIATRIC). - MEDICATION SUMMARY - MEDICATIONS TO CHANGE
Last Visit: 11/24/20 with MD Liz Lin  Next Appointment: 6/3/21 with MD Melgar  Previous Refill Encounter(s): 1/21/20 #90 with 3 refills    Requested Prescriptions     Pending Prescriptions Disp Refills    losartan (COZAAR) 25 mg tablet 90 Tab 3     Sig: Take 1 Tab by mouth daily. I will SWITCH the dose or number of times a day I take the medications listed below when I get home from the hospital:  None

## 2021-03-15 ENCOUNTER — APPOINTMENT (OUTPATIENT)
Dept: PULMONOLOGY | Facility: CLINIC | Age: 71
End: 2021-03-15
Payer: COMMERCIAL

## 2021-03-15 VITALS
SYSTOLIC BLOOD PRESSURE: 156 MMHG | OXYGEN SATURATION: 100 % | BODY MASS INDEX: 26.66 KG/M2 | HEIGHT: 69 IN | WEIGHT: 180 LBS | RESPIRATION RATE: 16 BRPM | TEMPERATURE: 97.5 F | DIASTOLIC BLOOD PRESSURE: 93 MMHG | HEART RATE: 76 BPM

## 2021-03-15 PROCEDURE — 99072 ADDL SUPL MATRL&STAF TM PHE: CPT

## 2021-03-15 PROCEDURE — 99244 OFF/OP CNSLTJ NEW/EST MOD 40: CPT

## 2021-03-15 PROCEDURE — 76604 US EXAM CHEST: CPT

## 2021-03-26 NOTE — PHYSICAL EXAM
[No Acute Distress] : no acute distress [Normal Oropharynx] : normal oropharynx [Normal Appearance] : normal appearance [No Neck Mass] : no neck mass [Normal Rate/Rhythm] : normal rate/rhythm [Normal S1, S2] : normal s1, s2 [No Murmurs] : no murmurs [No Resp Distress] : no resp distress [Clear to Auscultation Bilaterally] : clear to auscultation bilaterally [No Abnormalities] : no abnormalities [Benign] : benign [Normal Gait] : normal gait [No Clubbing] : no clubbing [No Cyanosis] : no cyanosis [No Edema] : no edema [FROM] : FROM [Normal Color/ Pigmentation] : normal color/ pigmentation [No Focal Deficits] : no focal deficits [Oriented x3] : oriented x3 [Normal Affect] : normal affect [TextBox_99] : Mild Kyphosis

## 2021-03-26 NOTE — DISCUSSION/SUMMARY
[FreeTextEntry1] : Thoracic Ultrasound: \par Reason for ultrasound: Dyspnea. Unable to take a deep breath. \par Findings: Bilateral normal diaphragmatic excursion. No pleural effusion. \par Interpretation: No paradoxical movement of the diaphragm or pleural effusion noted to explains patients symptoms. \par \par Images uploaded to [employer]

## 2021-03-26 NOTE — HISTORY OF PRESENT ILLNESS
[TextBox_4] : Interventional Pulmonology Consultation/Initial Visit Note\par \par The patient is a 70 Y.O. male with pmh of CAD s/p stent 2015, pituitary adenoma, neuropathy affecting the dorsal columns, hypothyroidism who presents to clinic for chronic SOB. Patient has been having sob for nearly 7-8 years and is progressively becoming worse. He states he cannot take a full breath feels like he needs to overexert to expand his lung. SOB can be a rest or with exertion. Denies cough, positional preference in sleep, wheezing, stridor, fevers, chills or chest pain. Had PFT in 2016 which only showed borderline decreased DLCO. CXR of lungs were clear. Serum bicarb was normal. Has not yet had PFT with muscle testing, CPET or CT of the chest. Also has chronic LE edema 2/2 to venous insufficiency. Neurology with no weakness but + ataxia, paraesthesias.\par \par SH- Physician, Never smoker, [place]

## 2021-03-26 NOTE — ASSESSMENT
[FreeTextEntry1] : 70 Y.O. male with pmh of CAD s/p stent 2015, pituitary adenoma, neuropathy affecting the dorsal columns, hypothyroidism who presents to clinic for chronic SOB\par \par # Shortness of breath\par - Bedside POCUS with good diaphragm excursion, no effusions.\par - Possibly related with his neuromuscular disease, less likely cardiac. \par - will check CT chest non-contrast, PFT's with muscle testing, 6MWT. \par - F/U in clinic when testing is performed. \par \par Dae Hyeon Kim\par PGY 6\par Pulmonary Critical Care Fellow\par

## 2021-03-26 NOTE — CONSULT LETTER
[Dear  ___] : Dear  [unfilled], [Consult Letter:] : I had the pleasure of evaluating your patient, [unfilled]. [Please see my note below.] : Please see my note below. [Consult Closing:] : Thank you very much for allowing me to participate in the care of this patient.  If you have any questions, please do not hesitate to contact me. [Sincerely,] : Sincerely, [FreeTextEntry3] : Chente Deleon MD\par Director of Interventional Pulmonology & Bronchoscopy\par . \par Division of Pulmonary, Critical Care & Sleep Medicine\par [facility] [employer] [facility].\par \par

## 2021-03-26 NOTE — REASON FOR VISIT
[Consultation] : a consultation [Shortness of Breath] : shortness of breath [TextBox_44] : Interventional Pulmonology Consutlation for Dyspnea

## 2021-03-26 NOTE — END OF VISIT
[] : Fellow [FreeTextEntry3] : 69 y/o male with pmh of CAD s/p stent 2015, pituitary adenoma, neuropathy affecting the dorsal columns, hypothyroidism who presents to clinic for chronic SOB. No obvious etiology noted on thoracic ultrasound. Will need CT of the CHEST and complete PFT's along with respiratory muscle testing to further elucidate etiology. May need CPET in future if results unclear. Will follow once testing completed.  [Time Spent: ___ minutes] : I have spent [unfilled] minutes of time on the encounter. [>50% of the face to face encounter time was spent on counseling and/or coordination of care for ___] : Greater than 50% of the face to face encounter time was spent on counseling and/or coordination of care for [unfilled]

## 2021-05-06 ENCOUNTER — APPOINTMENT (OUTPATIENT)
Dept: CT IMAGING | Facility: IMAGING CENTER | Age: 71
End: 2021-05-06
Payer: COMMERCIAL

## 2021-05-06 ENCOUNTER — OUTPATIENT (OUTPATIENT)
Dept: OUTPATIENT SERVICES | Facility: HOSPITAL | Age: 71
LOS: 1 days | End: 2021-05-06
Payer: COMMERCIAL

## 2021-05-06 DIAGNOSIS — Z00.8 ENCOUNTER FOR OTHER GENERAL EXAMINATION: ICD-10-CM

## 2021-05-06 DIAGNOSIS — Z00.00 ENCOUNTER FOR GENERAL ADULT MEDICAL EXAMINATION WITHOUT ABNORMAL FINDINGS: ICD-10-CM

## 2021-05-06 DIAGNOSIS — Z98.89 OTHER SPECIFIED POSTPROCEDURAL STATES: Chronic | ICD-10-CM

## 2021-05-06 PROCEDURE — 71250 CT THORAX DX C-: CPT | Mod: 26

## 2021-05-06 PROCEDURE — 71250 CT THORAX DX C-: CPT

## 2021-05-14 ENCOUNTER — APPOINTMENT (OUTPATIENT)
Dept: PULMONOLOGY | Facility: CLINIC | Age: 71
End: 2021-05-14

## 2021-06-15 ENCOUNTER — APPOINTMENT (OUTPATIENT)
Dept: PULMONOLOGY | Facility: CLINIC | Age: 71
End: 2021-06-15
Payer: COMMERCIAL

## 2021-06-15 PROCEDURE — 94726 PLETHYSMOGRAPHY LUNG VOLUMES: CPT

## 2021-06-15 PROCEDURE — 94618 PULMONARY STRESS TESTING: CPT

## 2021-06-15 PROCEDURE — ZZZZZ: CPT

## 2021-06-15 PROCEDURE — 94799 UNLISTED PULMONARY SVC/PX: CPT

## 2021-06-15 PROCEDURE — 94010 BREATHING CAPACITY TEST: CPT

## 2021-06-15 PROCEDURE — 99072 ADDL SUPL MATRL&STAF TM PHE: CPT

## 2021-06-15 PROCEDURE — 94729 DIFFUSING CAPACITY: CPT

## 2021-07-03 LAB — BACTERIA SPT CULT: NORMAL

## 2021-07-27 ENCOUNTER — APPOINTMENT (OUTPATIENT)
Dept: DERMATOLOGY | Facility: CLINIC | Age: 71
End: 2021-07-27
Payer: COMMERCIAL

## 2021-07-27 ENCOUNTER — NON-APPOINTMENT (OUTPATIENT)
Age: 71
End: 2021-07-27

## 2021-07-27 VITALS — WEIGHT: 185 LBS | BODY MASS INDEX: 27.4 KG/M2 | HEIGHT: 69 IN

## 2021-07-27 PROCEDURE — 99072 ADDL SUPL MATRL&STAF TM PHE: CPT

## 2021-07-27 PROCEDURE — 99204 OFFICE O/P NEW MOD 45 MIN: CPT

## 2021-07-27 RX ORDER — KETOCONAZOLE 20.5 MG/ML
2 SHAMPOO, SUSPENSION TOPICAL
Qty: 1 | Refills: 5 | Status: ACTIVE | COMMUNITY
Start: 2021-07-27 | End: 1900-01-01

## 2021-09-17 ENCOUNTER — EMERGENCY (EMERGENCY)
Facility: HOSPITAL | Age: 71
LOS: 1 days | Discharge: HOME | End: 2021-09-17
Attending: STUDENT IN AN ORGANIZED HEALTH CARE EDUCATION/TRAINING PROGRAM | Admitting: STUDENT IN AN ORGANIZED HEALTH CARE EDUCATION/TRAINING PROGRAM
Payer: COMMERCIAL

## 2021-09-17 VITALS
HEART RATE: 85 BPM | DIASTOLIC BLOOD PRESSURE: 77 MMHG | RESPIRATION RATE: 16 BRPM | OXYGEN SATURATION: 100 % | SYSTOLIC BLOOD PRESSURE: 138 MMHG | HEIGHT: 70 IN | TEMPERATURE: 97 F

## 2021-09-17 VITALS
SYSTOLIC BLOOD PRESSURE: 140 MMHG | HEART RATE: 66 BPM | DIASTOLIC BLOOD PRESSURE: 87 MMHG | RESPIRATION RATE: 16 BRPM | OXYGEN SATURATION: 100 %

## 2021-09-17 DIAGNOSIS — Z98.89 OTHER SPECIFIED POSTPROCEDURAL STATES: Chronic | ICD-10-CM

## 2021-09-17 LAB
ALBUMIN SERPL ELPH-MCNC: 4.5 G/DL — SIGNIFICANT CHANGE UP (ref 3.3–5)
ALP SERPL-CCNC: 75 U/L — SIGNIFICANT CHANGE UP (ref 40–120)
ALT FLD-CCNC: 49 U/L — HIGH (ref 4–41)
ANION GAP SERPL CALC-SCNC: 7 MMOL/L — SIGNIFICANT CHANGE UP (ref 7–14)
APTT BLD: 32.8 SEC — SIGNIFICANT CHANGE UP (ref 27–36.3)
AST SERPL-CCNC: 44 U/L — HIGH (ref 4–40)
BILIRUB SERPL-MCNC: 1.1 MG/DL — SIGNIFICANT CHANGE UP (ref 0.2–1.2)
BUN SERPL-MCNC: 20 MG/DL — SIGNIFICANT CHANGE UP (ref 7–23)
CALCIUM SERPL-MCNC: 9.7 MG/DL — SIGNIFICANT CHANGE UP (ref 8.4–10.5)
CHLORIDE SERPL-SCNC: 106 MMOL/L — SIGNIFICANT CHANGE UP (ref 98–107)
CO2 SERPL-SCNC: 29 MMOL/L — SIGNIFICANT CHANGE UP (ref 22–31)
CREAT SERPL-MCNC: 1.03 MG/DL — SIGNIFICANT CHANGE UP (ref 0.5–1.3)
GLUCOSE SERPL-MCNC: 85 MG/DL — SIGNIFICANT CHANGE UP (ref 70–99)
HCT VFR BLD CALC: 40.1 % — SIGNIFICANT CHANGE UP (ref 39–50)
HGB BLD-MCNC: 12.7 G/DL — LOW (ref 13–17)
INR BLD: 1.3 RATIO — HIGH (ref 0.88–1.16)
MCHC RBC-ENTMCNC: 27.5 PG — SIGNIFICANT CHANGE UP (ref 27–34)
MCHC RBC-ENTMCNC: 31.7 GM/DL — LOW (ref 32–36)
MCV RBC AUTO: 87 FL — SIGNIFICANT CHANGE UP (ref 80–100)
NRBC # BLD: 0 /100 WBCS — SIGNIFICANT CHANGE UP
NRBC # FLD: 0 K/UL — SIGNIFICANT CHANGE UP
PLATELET # BLD AUTO: 165 K/UL — SIGNIFICANT CHANGE UP (ref 150–400)
POTASSIUM SERPL-MCNC: 4.3 MMOL/L — SIGNIFICANT CHANGE UP (ref 3.5–5.3)
POTASSIUM SERPL-SCNC: 4.3 MMOL/L — SIGNIFICANT CHANGE UP (ref 3.5–5.3)
PROT SERPL-MCNC: 7.1 G/DL — SIGNIFICANT CHANGE UP (ref 6–8.3)
PROTHROM AB SERPL-ACNC: 14.8 SEC — HIGH (ref 10.6–13.6)
RBC # BLD: 4.61 M/UL — SIGNIFICANT CHANGE UP (ref 4.2–5.8)
RBC # FLD: 13.5 % — SIGNIFICANT CHANGE UP (ref 10.3–14.5)
SODIUM SERPL-SCNC: 142 MMOL/L — SIGNIFICANT CHANGE UP (ref 135–145)
WBC # BLD: 5.37 K/UL — SIGNIFICANT CHANGE UP (ref 3.8–10.5)
WBC # FLD AUTO: 5.37 K/UL — SIGNIFICANT CHANGE UP (ref 3.8–10.5)

## 2021-09-17 PROCEDURE — 99284 EMERGENCY DEPT VISIT MOD MDM: CPT

## 2021-09-17 NOTE — PROGRESS NOTE ADULT - SUBJECTIVE AND OBJECTIVE BOX
· Chief Complaint: The patient is a 71y Male complaining of spitting up blood.  · HPI Objective Statement: 71 male, Hx: HTN, HLD, BPH, CAD - presents with demonstration of spitting up blood. Pt reports he tastes blood in his mouth and over the last few hours has been spitting up blood. Denies any recent trauma/falls. Denies any fevers, chills, cough, CP, SOB, abdominal pain, nausea, vomiting, diarrhea, constipation, bloody stools, dysuric symptoms. Denies any prior history of bleeding dyscrasias.        PAST MEDICAL & SURGICAL HISTORY:  Pituitary adenoma  treated with meds    HTN (hypertension)    Hypothyroidism    Pericarditis    GERD (gastroesophageal reflux disease)    Prostatism    Leg edema    Varicose vein    Stented coronary artery  2015    CAD (coronary artery disease)    Peripheral neuropathy    BPH (benign prostatic hypertrophy)    Venous insufficiency    H/O foot surgery  R        MEDICATIONS  (STANDING):    MEDICATIONS  (PRN):      Allergies    No Known Allergies    Intolerances        FAMILY HISTORY:  Family history of CHF (congestive heart failure)    Family history of diabetes mellitus        *SOCIAL HISTORY: (guardian or who pt came with), (smoking hx)    *Last Dental Visit:    Vital Signs Last 24 Hrs  T(C): 36.2 (17 Sep 2021 18:46), Max: 36.2 (17 Sep 2021 18:46)  T(F): 97.2 (17 Sep 2021 18:46), Max: 97.2 (17 Sep 2021 18:46)  HR: 66 (17 Sep 2021 20:04) (66 - 85)  BP: 140/87 (17 Sep 2021 20:04) (138/77 - 140/87)  BP(mean): --  RR: 16 (17 Sep 2021 20:04) (16 - 16)  SpO2: 100% (17 Sep 2021 20:04) (100% - 100%)    EOE:  TMJ (-   ) clicks                    ( -   ) pops                    (  -  ) crepitus             Mandible <<FROM>>             Facial bones and MOM <<grossly intact>>             ( -  ) trismus             ( -  ) LAD             ( -  ) swelling             ( -  ) asymmetry             ( -  ) palpation             ( -  ) SOB             ( -  ) dysphagia             ( -  ) LOC    IOE:  permanent dentition grossly intact with multiple restored teeth             (+) bleeding of gingiva in between teeth #30 and #31            hard/soft palate:            tongue/FOM <<WNL>>           labial/buccal mucosa <<WNL>>           (  - ) percussion           (-  ) palpation           ( -  ) swelling            ( - ) Mobility           ( - ) Pain         LABS:                        12.7   5.37  )-----------( 165      ( 17 Sep 2021 20:15 )             40.1     09-17    142  |  106  |  20  ----------------------------<  85  4.3   |  29  |  1.03    Ca    9.7      17 Sep 2021 20:15    TPro  7.1  /  Alb  4.5  /  TBili  1.1  /  DBili  x   /  AST  44<H>  /  ALT  49<H>  /  AlkPhos  75  09-17    WBC Count: 5.37 K/uL [3.80 - 10.50] (09-17 @ 20:15)  Platelet Count - Automated: 165 K/uL [150 - 400] (09-17 @ 20:15)  INR: 1.30 ratio *H* [0.88 - 1.16] (09-17 @ 20:15)        *DENTAL RADIOGRAPHS: PA and Pan taken and reviewed. [place] #30 and Amalgam filling #31. No signs of odontogenic infections present.       ASSESSMENT: Pt. has inflamed gingival tissue in the area adjacent to teeth #30 and #31. Pt has had bleeding in the area for 2+ hours but reports spitting up clots. Gingival bleeding due to gingival inflammation potentially secondary to food impaction.     PROCEDURE:  Discussed findings with patient and firm gauze pressure applied to site of bleeding for 15 minutes at a time. After holding gauze pressure, clot is visualized and beginning to form. Pt. instructed to have no straw use, no spitting, no hot or spicy foods, and to not irritate clot for 24 hours. Pt. understood and accepted. Pt. also instructed to apply firm gauze pressure to area until pt. feels they are no longer bleeding and hemostasis is ensured.     RECOMMENDATIONS:  1) Firm gauze pressure on site of gingival bleeding for 15 minute intervals until hemostasis is ensured.   2) Dental F/U with outpatient dentist for comprehensive dental care.   3) If any difficulty swallowing/breathing, fever occur, page dental.     Mike Rockwell DDS #[id number]

## 2021-09-17 NOTE — ED ADULT NURSE NOTE - OBJECTIVE STATEMENT
Pt arriving to room 3 A&OX4 ambulatory c/o continuous bleeding gums x 2 hours. Hx CAD. No active bleeding noted. Airway patent. Resp even and unlabored. Pt denies CP, SOB, dizziness/lightheadedness, abdominal pain. Swelling noted to BLE, Pt states this is his baseline. 20g IV placed in RAC. Labs drawn and sent.

## 2021-09-17 NOTE — ED PROVIDER NOTE - OBJECTIVE STATEMENT
71 male, Hx: HTN, HLD, BPH, CAD - presents with demonstration of spitting up blood. Pt reports he tastes blood in his mouth and over the last few hours has been spitting up blood. Denies any recent trauma/falls. Denies any fevers, chills, cough, CP, SOB, abdominal pain, nausea, vomiting, diarrhea, constipation, bloody stools, dysuric symptoms. Denies any prior history of bleeding dyscrasias. 71 male, Hx: HTN, HLD, BPH, CAD - presents with demonstration of spitting up blood. Pt reports he tastes blood in his mouth and over the last few hours has been spitting up blood. Denies any recent trauma/falls. Denies any fevers, chills, cough, CP, SOB, abdominal pain, nausea, vomiting, diarrhea, constipation, bloody stools, dysuric symptoms, toothache. Denies any prior history of bleeding dyscrasias. No blood thinners. No recent trauma to mouth or chest. No dysphagia or dyspnea. No difficulty chewing or facial swelling. No provocative or palliative factors. No other current complaints. Labile

## 2021-09-17 NOTE — ED PROVIDER NOTE - NS ED ROS FT
Constitution: No Fever or chills  Eyes: No visual changes  HEENT: (+) Spitting Up Blood; No cough, No Discharge, No Rhinorrhea, No URI symptoms  Cardio: No Chest pain, No Palpitations, No Dyspnea  Resp: No SOB, No Wheezing  GI: No abdominal pain, No Nausea, No Vomiting, No Constipation, No Diarrhea  : No burning upon urination, trouble urinating, no foul odor from urine  MSK: No Back pain, No Numbness, No Tingling, No Weakness  Neuro: No Headache, Normal Gait  Skin: No rashes, No Bruising, No Swelling

## 2021-09-17 NOTE — ED PROVIDER NOTE - NSICDXFAMILYHX_GEN_ALL_CORE_FT
FAMILY HISTORY:  Family history of CHF (congestive heart failure)  Family history of diabetes mellitus

## 2021-09-17 NOTE — ED PROVIDER NOTE - ATTENDING CONTRIBUTION TO CARE
I have personally performed a history and physical examination of the patient and discussed management with the resident as well as the patient.  I reviewed the resident's note and agree with the documented findings and plan of care.  I have authored and modified critical sections of the Provider Note, including but not limited to HPI, Physical Exam and MDM.    71 male pmh HTN, HLD, BPH, CAD presents with demonstration of spitting up blood. Gingival bleeding on buccal side of tooth 30 and 31. Pt has no history of bleeding dyscrasia. No easy bruising, delayed healing, vitamin deficiency, poor dietary intake. Will screen for possible hemoptysis with cxr, likely bleeding due to oral source. Will screen platelets, metabolic function, and hgb with cbc, cmp. Dental consult. Likely outpatient follow up.

## 2021-09-17 NOTE — ED PROVIDER NOTE - NSFOLLOWUPINSTRUCTIONS_ED_ALL_ED_FT
You were seen and evaluated in the Emergency Department for your bleeding. You were evaluated clinically.    Your exam demonstrates that there is likely an bleeding related to your tooth that will require further evaluation and intervention by a Dental Specialist. Please follow up at the Dental Clinic (contact info below) for further evaluation of your infection.     [facility]  [address] [place] [address] [phone]    Should you develop new or worsening tooth pain, fevers, chills, discharge from the tooth site, trouble opening your mouth/swallowing, nausea, vomiting - please return to the ED for immediate evaluation.

## 2021-09-17 NOTE — ED PROVIDER NOTE - CLINICAL SUMMARY MEDICAL DECISION MAKING FREE TEXT BOX
71 male, Hx: HTN, HLD, BPH, CAD - presents with demonstration of spitting up blood. Pt reports he tastes blood in his mouth and over the last few hours has been spitting up blood. 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 VSS, non-toxic.

## 2021-09-17 NOTE — ED PROVIDER NOTE - NSICDXPASTMEDICALHX_GEN_ALL_CORE_FT
PAST MEDICAL HISTORY:  BPH (benign prostatic hypertrophy)     CAD (coronary artery disease)     GERD (gastroesophageal reflux disease)     HTN (hypertension)     Hypothyroidism     Leg edema     Pericarditis     Peripheral neuropathy     Pituitary adenoma treated with meds    Prostatism     Stented coronary artery 2015    Varicose vein     Venous insufficiency

## 2021-09-17 NOTE — ED PROVIDER NOTE - PATIENT PORTAL LINK FT
You can access the FollowMyHealth Patient Portal offered by [facility] by registering at the following website: [email or web address]. By joining [facility]’s FollowMyHealth portal, you will also be able to view your health information using other applications (apps) compatible with our system.

## 2021-09-17 NOTE — ED PROVIDER NOTE - PHYSICAL EXAMINATION
GEN - NAD; non-toxic; A+Ox3, speaking full sentences, steady gait   HENT - NC/AT, No visible Ecchymosis, No Abrasions, (+) Gingival Bleeding  EYES - EOMI, no conjunctival pallor, no scleral icterus  PULM - CTA B/L,  symmetric breath sounds  CV -  RRR, S1 S2, no murmurs 2+ Pulses B/L UE  GI - NT/ND, soft, no guarding, no rebound, no masses    MSK/EXT- no edema, no gross deformity, warm and well perfused, no calf tenderness/swelling/erythema   SKIN - no rash or bruising  NEUROLOGIC - alert, moving all 4 ext with 5/5 Strength GEN - NAD; non-toxic; A+Ox3, speaking full sentences, steady gait   HENT - NC/AT, No visible Ecchymosis, No Abrasions, (+) Gingival Bleeding on buccal side of tooh #30 and 31  EYES - EOMI, no conjunctival pallor, no scleral icterus  PULM - CTA B/L,  symmetric breath sounds  CV -  RRR, S1 S2, no murmurs 2+ Pulses B/L UE  GI - NT/ND, soft, no guarding, no rebound, no masses    MSK/EXT- no edema, no gross deformity, warm and well perfused, no calf tenderness/swelling/erythema   SKIN - no rash or bruising  NEUROLOGIC - alert, moving all 4 ext with 5/5 Strength

## 2021-09-17 NOTE — ED ADULT TRIAGE NOTE - CHIEF COMPLAINT QUOTE
Pt states that he started spitting up blood 1 hr ago.  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  PMH BPH, CAD, PVD, pituitary adenoma, GERD

## 2021-09-17 NOTE — ED PROVIDER NOTE - PROGRESS NOTE DETAILS
Resident Jeffrey: preliminary evaluation without any acute hematologic dyscrasias - unclear etiology of elevated INR, but do not suspect it is clinically related to gingival bleeding. Dental eval with demonstration of local bleeding likely in the setting of partial fracture - hemostasis achieved with gauze and pressure, recommends outpatient followup.

## 2021-10-15 ENCOUNTER — NON-APPOINTMENT (OUTPATIENT)
Age: 71
End: 2021-10-15

## 2021-10-15 ENCOUNTER — APPOINTMENT (OUTPATIENT)
Dept: INTERNAL MEDICINE | Facility: CLINIC | Age: 71
End: 2021-10-15
Payer: COMMERCIAL

## 2021-10-15 VITALS — WEIGHT: 181.4 LBS | BODY MASS INDEX: 27.18 KG/M2 | HEIGHT: 68.5 IN

## 2021-10-15 VITALS — DIASTOLIC BLOOD PRESSURE: 70 MMHG | SYSTOLIC BLOOD PRESSURE: 110 MMHG

## 2021-10-15 VITALS — DIASTOLIC BLOOD PRESSURE: 70 MMHG | TEMPERATURE: 98 F | HEART RATE: 80 BPM | SYSTOLIC BLOOD PRESSURE: 110 MMHG

## 2021-10-15 DIAGNOSIS — B36.0 PITYRIASIS VERSICOLOR: ICD-10-CM

## 2021-10-15 PROCEDURE — 93000 ELECTROCARDIOGRAM COMPLETE: CPT

## 2021-10-15 PROCEDURE — 99214 OFFICE O/P EST MOD 30 MIN: CPT | Mod: 25

## 2021-10-15 RX ORDER — SILODOSIN 8 MG/1
8 CAPSULE ORAL
Refills: 0 | Status: ACTIVE | COMMUNITY

## 2021-10-15 RX ORDER — ZOSTER VACCINE RECOMBINANT, ADJUVANTED 50 MCG/0.5
50 KIT INTRAMUSCULAR
Qty: 2 | Refills: 0 | Status: DISCONTINUED | COMMUNITY
Start: 2018-04-23 | End: 2021-10-15

## 2021-10-15 NOTE — ASSESSMENT
[FreeTextEntry1] : This is a 71-year-old male who has several ongoing problems\par \par He has shortness of breath which he experiences at rest but less so on exertion he has been seen by pulmonary.\par He did have pulmonary function tests which are reviewed he has mild obstructive disease mildly reduced diffusion and respiratory muscle force is reported as low compared with normal for age. I did discuss that I did not think it was reasonable for him to have a breath at rest while not on exertion. This certainly could be anxiety and I did suggest possibly use of an antianxiety medication at least for trial. In addition he will be going for a consultation with a pulmonologist for neuromuscular disease\par \par He remains anemic with a hemoglobin of 12.3 he did see GI and he states that he will go for his gastroscopy colonoscopy and camera study if necessary\par \par He does have nocturia and frequency and in addition he had a mildly elevated PSA he has been switched to Rapaflo which has given him some relief he will follow-up with his urologist\par \par He did see dermatology who put him on ketoconazole for his tinea cruris versicolor he has not begun this as yet\par \par He does have a history of ASHD he has had no chest pain his EKG is unchanged\par \par He remains on atorvastatin his LDL is at goal at 51\par \par He did have a cortisol which was low at 5.5 this was not a.m. he will repeat this.\par \par Did have an abnormal CT and is scheduled to repeat next month\par \par Concerned about the fact that he has lost height he has osteopenia and a low testosterone at this point I think he should see a another endocrinologist for a more cogent opinion on medication\par \par He remains on Dostinex for his pituitary adenoma prolactin levels are suppressed. 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 He has had no chest pain and EKG was obtained and was normal\par \par He has received his Covid vaccinations and will get a flu at his institution

## 2021-10-15 NOTE — REVIEW OF SYSTEMS
[Dyspnea on Exertion] : dyspnea on exertion [Nocturia] : nocturia [Negative] : Psychiatric [de-identified] : Neuropathy

## 2021-10-15 NOTE — HISTORY OF PRESENT ILLNESS
[FreeTextEntry1] : Is a 71-year-old male for evaluation treatment of his hypertension ASHD hypothyroidism iron deficiency anemia dyspnea on exertion pituitary adenoma neuropathy and rash.\par He also had a recent emergency room visit for hemoptysis that turned out to be dental in origin [de-identified] : Has had multiple interchanges since his last visit. He has visited with GI pulmonary and had the above-mentioned emergency room visit for bleeding gums\par \par His major complaints remain his rash and the feeling that he cannot get a deep breath while sitting but is able to walk several blocks

## 2021-10-15 NOTE — REVIEW OF SYSTEMS
[Dyspnea on Exertion] : dyspnea on exertion [Nocturia] : nocturia [Negative] : Psychiatric [de-identified] : Neuropathy

## 2021-10-15 NOTE — HISTORY OF PRESENT ILLNESS
[FreeTextEntry1] : Is a 71-year-old male for evaluation treatment of his hypertension ASHD hypothyroidism iron deficiency anemia dyspnea on exertion pituitary adenoma neuropathy and rash.\par He also had a recent emergency room visit for hemoptysis that turned out to be dental in origin [de-identified] : Has had multiple interchanges since his last visit. He has visited with GI pulmonary and had the above-mentioned emergency room visit for bleeding gums\par \par His major complaints remain his rash and the feeling that he cannot get a deep breath while sitting but is able to walk several blocks

## 2021-12-29 LAB — ACID FAST STN SPT: NORMAL

## 2022-05-27 ENCOUNTER — APPOINTMENT (OUTPATIENT)
Dept: ORTHOPEDIC SURGERY | Facility: CLINIC | Age: 72
End: 2022-05-27
Payer: COMMERCIAL

## 2022-05-27 VITALS — WEIGHT: 180 LBS | HEIGHT: 69 IN | BODY MASS INDEX: 26.66 KG/M2

## 2022-05-27 DIAGNOSIS — S83.231A COMPLEX TEAR OF MEDIAL MENISCUS, CURRENT INJURY, RIGHT KNEE, INITIAL ENCOUNTER: ICD-10-CM

## 2022-05-27 PROCEDURE — 99213 OFFICE O/P EST LOW 20 MIN: CPT

## 2022-06-17 ENCOUNTER — TRANSCRIPTION ENCOUNTER (OUTPATIENT)
Age: 72
End: 2022-06-17

## 2022-09-14 ENCOUNTER — NON-APPOINTMENT (OUTPATIENT)
Age: 72
End: 2022-09-14

## 2022-09-14 ENCOUNTER — APPOINTMENT (OUTPATIENT)
Dept: INTERNAL MEDICINE | Facility: CLINIC | Age: 72
End: 2022-09-14

## 2022-09-14 VITALS
SYSTOLIC BLOOD PRESSURE: 120 MMHG | DIASTOLIC BLOOD PRESSURE: 80 MMHG | BODY MASS INDEX: 36.32 KG/M2 | HEIGHT: 60 IN | WEIGHT: 185 LBS

## 2022-09-14 DIAGNOSIS — G62.9 POLYNEUROPATHY, UNSPECIFIED: ICD-10-CM

## 2022-09-14 DIAGNOSIS — E78.00 PURE HYPERCHOLESTEROLEMIA, UNSPECIFIED: ICD-10-CM

## 2022-09-14 DIAGNOSIS — D35.2 BENIGN NEOPLASM OF PITUITARY GLAND: ICD-10-CM

## 2022-09-14 DIAGNOSIS — E03.9 HYPOTHYROIDISM, UNSPECIFIED: ICD-10-CM

## 2022-09-14 DIAGNOSIS — Z00.00 ENCOUNTER FOR GENERAL ADULT MEDICAL EXAMINATION W/OUT ABNORMAL FINDINGS: ICD-10-CM

## 2022-09-14 PROCEDURE — 93000 ELECTROCARDIOGRAM COMPLETE: CPT | Mod: 59

## 2022-09-14 PROCEDURE — 99214 OFFICE O/P EST MOD 30 MIN: CPT | Mod: 25

## 2022-09-14 PROCEDURE — G0439: CPT

## 2022-09-14 NOTE — REVIEW OF SYSTEMS
[Fatigue] : fatigue [Chest Pain] : chest pain [Dyspnea on Exertion] : dyspnea on exertion [Nocturia] : nocturia [Frequency] : frequency [Joint Pain] : joint pain [Skin Rash] : skin rash [Unsteady Walk] : ataxia [Negative] : Heme/Lymph

## 2022-09-14 NOTE — HEALTH RISK ASSESSMENT
[Fair] : ~his/her~ current health as fair  [Good] : ~his/her~  mood as  good [Never] : Never [Yes] : Yes [0] : 2) Feeling down, depressed, or hopeless: Not at all (0) [PHQ-2 Negative - No further assessment needed] : PHQ-2 Negative - No further assessment needed [None] : None [With Significant Other] : lives with significant other [Employed] : employed [Graduate School] : graduate school [] :  [Sexually Active] : sexually active [Fully functional (bathing, dressing, toileting, transferring, walking, feeding)] : Fully functional (bathing, dressing, toileting, transferring, walking, feeding) [Fully functional (using the telephone, shopping, preparing meals, housekeeping, doing laundry, using] : Fully functional and needs no help or supervision to perform IADLs (using the telephone, shopping, preparing meals, housekeeping, doing laundry, using transportation, managing medications and managing finances) [Smoke Detector] : smoke detector [Carbon Monoxide Detector] : carbon monoxide detector [Safety elements used in home] : safety elements used in home [Seat Belt] :  uses seat belt [Sunscreen] : uses sunscreen [I will adhere to the patient's wishes.] : I will adhere to the patient's wishes. [Change in mental status noted] : No change in mental status noted [Guns at Home] : no guns at home [Travel to Developing Areas] : does not  travel to developing areas [TB Exposure] : is not being exposed to tuberculosis [Caregiver Concerns] : does not have caregiver concerns

## 2022-09-14 NOTE — ASSESSMENT
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  He did not follow-up with pulmonary because of the illness of the physician I did give him an alternate pulmonologist\par \par In addition he did have an abnormal CT of the chest with a groundglass appearance that was supposed to have been repeated and order was put in\par He does have osteopenia and low testosterone or low free testosterone an order for a bone density was put in\par \par He does have BPH and will most likely be started on finasteride his PSA is normal\par \par A new problem has been a torn meniscus in his right knee this was treated conservatively with good resolution\par \par He does have apparently tinea versicolor he has been given lotions but has not used them diligently he states he will

## 2022-09-14 NOTE — HISTORY OF PRESENT ILLNESS
UROLOGY CLINIC VISIT PATIENT INSTRUCTIONS    -Continue current medications, including Flomax, finasteride, trospium, and mirabegron.   -Follow up with me in 6 months.     If you have any issues, questions or concerns in the meantime, do not hesitate to contact us at [phone] or via [employer].     It was a pleasure meeting with you today.  Thank you for allowing me and my team the privilege of caring for you today.  YOU are the reason we are here, and I truly hope we provided you with the excellent service you deserve.  Please let us know if there is anything else we can do for you so that we can be sure you are leaving completely satisfied with your care experience.    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  He has peripheral neuropathy or congenital neuropathy resulting with some difficulty walking and some ataxia\par He has a chronic rash of unknown etiology\par He has shortness of breath which is relatively peculiar in the sense that when he exercises he feels he cannot get an complete breath.\par In addition he does have chest pain which is not anginal in origin but occurs with the exertion and and is immediately\par He does have a history of iron deficiency anemia which unfortunately he did not work-up\par Continues to have symptoms of BPH

## 2022-10-25 ENCOUNTER — APPOINTMENT (OUTPATIENT)
Dept: CT IMAGING | Facility: CLINIC | Age: 72
End: 2022-10-25

## 2022-10-25 ENCOUNTER — OUTPATIENT (OUTPATIENT)
Dept: OUTPATIENT SERVICES | Facility: HOSPITAL | Age: 72
LOS: 1 days | End: 2022-10-25
Payer: COMMERCIAL

## 2022-10-25 DIAGNOSIS — Z00.8 ENCOUNTER FOR OTHER GENERAL EXAMINATION: ICD-10-CM

## 2022-10-25 DIAGNOSIS — Z98.89 OTHER SPECIFIED POSTPROCEDURAL STATES: Chronic | ICD-10-CM

## 2022-10-25 PROCEDURE — 71250 CT THORAX DX C-: CPT

## 2022-10-25 PROCEDURE — 71250 CT THORAX DX C-: CPT | Mod: 26

## 2022-11-07 ENCOUNTER — APPOINTMENT (OUTPATIENT)
Dept: PULMONOLOGY | Facility: CLINIC | Age: 72
End: 2022-11-07

## 2022-11-21 ENCOUNTER — APPOINTMENT (OUTPATIENT)
Dept: PULMONOLOGY | Facility: CLINIC | Age: 72
End: 2022-11-21

## 2022-11-21 VITALS
DIASTOLIC BLOOD PRESSURE: 79 MMHG | TEMPERATURE: 98.2 F | WEIGHT: 185 LBS | SYSTOLIC BLOOD PRESSURE: 127 MMHG | BODY MASS INDEX: 36.32 KG/M2 | HEIGHT: 60 IN | OXYGEN SATURATION: 97 % | HEART RATE: 72 BPM

## 2022-11-21 DIAGNOSIS — R06.00 DYSPNEA, UNSPECIFIED: ICD-10-CM

## 2022-11-21 DIAGNOSIS — R93.89 ABNORMAL FINDINGS ON DIAGNOSTIC IMAGING OF OTHER SPECIFIED BODY STRUCTURES: ICD-10-CM

## 2022-11-21 DIAGNOSIS — R06.02 SHORTNESS OF BREATH: ICD-10-CM

## 2022-11-21 PROCEDURE — 99214 OFFICE O/P EST MOD 30 MIN: CPT

## 2022-11-25 PROBLEM — R06.02 SHORTNESS OF BREATH ON EXERTION: Status: ACTIVE | Noted: 2021-03-15

## 2022-11-25 PROBLEM — R93.89 ABNORMAL CHEST CT: Status: RESOLVED | Noted: 2021-05-28 | Resolved: 2022-11-25

## 2022-11-25 NOTE — REVIEW OF SYSTEMS
[SOB on Exertion] : sob on exertion [Negative] : Hematologic [Fever] : no fever [Fatigue] : no fatigue [Chills] : no chills [Cough] : no cough [Hemoptysis] : no hemoptysis [Chest Tightness] : no chest tightness [Sputum] : no sputum [Pleuritic Pain] : no pleuritic pain [Focal Weakness] : no focal weakness [TextBox_119] : + ataxia and paraesthesias

## 2022-11-25 NOTE — HISTORY OF PRESENT ILLNESS
[TextBox_4] : Interventional Pulmonology Consultation/Visit Note\yadira saenz 71 y/o M with pmh of CAD s/p stent 2015, pituitary adenoma, neuropathy affecting the dorsal columns, hypothyroidism who presents to clinic for chronic SOB. Patient has been having sob for nearly 7-8 years and is progressively becoming worse. He states he cannot take a full breath feels like he needs to overexert to expand his lung. SOB can be a rest or with exertion.\par \yadira Patient was last seen in clinic ~1.5 years ago. At that time, he underwent PFTs and CT of the chest. PFTs showed mild reduction in respiratory muscle force. He also underwent a CT chest in 2021 which showed subcentimeter nodules and GGO in the RML. The nodules on repeat imaging from 2022 are stable and GGO has resolved. \par \yadira Patient reports two evaluations with neurology, at [facility] in ~2010 and [facility] ~2019 without a definitive diagnosis. He reports continued VILLA and states he feels many times he cannot fully expand his lungs. He reports some tingling in his fingers with loss of fine motor skills as well as intermittent ataxia. \par \par He follows with Dr. Strauss for cardiology and reports he had a work up last week which was WNL.

## 2022-11-25 NOTE — ASSESSMENT
[FreeTextEntry1] : 72M with PMHx of CAD, pituitary adenoma and neuropathy of unclear etiology who presents to pulmonary clinic for follow up. \par \par #Dyspnea on exertion:\par - patient reports continued but stable dyspnea on exertion along with other neurologic complains including paraesthesias, fine motor deficits and ataxia\par - pulmonary work up unrevealing at this time--> PFTs with normal spirometry and lung volumes and mildly decreased force and CT chest without any structural abnormalities that could explain symptoms\par - patient neurologic symptoms are not purely motor nor purely sensory and reports MR reports unrevealing for spinal cord disease to explain combined motor and sensory deficits\par \par #Lung nodules:\par - .2 cm RLL and .4cm GG nodule in the LLL unchanged from CT chest in 2021\par \par WIll reach to [facility] neuromuscular department for evaluation of symptoms. Will try to obtain records from [facility] for prior work up. No pulmonary intervention indicated at this time especially as the GGO noted on the CT has resolved and was likely inflammatory. \par \par RTC PRN.

## 2023-03-08 ENCOUNTER — OUTPATIENT (OUTPATIENT)
Dept: OUTPATIENT SERVICES | Facility: HOSPITAL | Age: 73
LOS: 1 days | End: 2023-03-08
Payer: COMMERCIAL

## 2023-03-08 VITALS
RESPIRATION RATE: 16 BRPM | OXYGEN SATURATION: 96 % | WEIGHT: 195.11 LBS | HEIGHT: 68.5 IN | TEMPERATURE: 98 F | HEART RATE: 71 BPM | SYSTOLIC BLOOD PRESSURE: 120 MMHG | DIASTOLIC BLOOD PRESSURE: 82 MMHG

## 2023-03-08 DIAGNOSIS — Z95.5 PRESENCE OF CORONARY ANGIOPLASTY IMPLANT AND GRAFT: Chronic | ICD-10-CM

## 2023-03-08 DIAGNOSIS — K40.30 UNILATERAL INGUINAL HERNIA, WITH OBSTRUCTION, WITHOUT GANGRENE, NOT SPECIFIED AS RECURRENT: ICD-10-CM

## 2023-03-08 DIAGNOSIS — Z98.89 OTHER SPECIFIED POSTPROCEDURAL STATES: Chronic | ICD-10-CM

## 2023-03-08 DIAGNOSIS — Z98.890 OTHER SPECIFIED POSTPROCEDURAL STATES: Chronic | ICD-10-CM

## 2023-03-08 DIAGNOSIS — K40.90 UNILATERAL INGUINAL HERNIA, WITHOUT OBSTRUCTION OR GANGRENE, NOT SPECIFIED AS RECURRENT: ICD-10-CM

## 2023-03-08 LAB
ALBUMIN SERPL ELPH-MCNC: 4.4 G/DL — SIGNIFICANT CHANGE UP (ref 3.3–5)
ALP SERPL-CCNC: 75 U/L — SIGNIFICANT CHANGE UP (ref 40–120)
ALT FLD-CCNC: 51 U/L — HIGH (ref 4–41)
ANION GAP SERPL CALC-SCNC: 6 MMOL/L — LOW (ref 7–14)
AST SERPL-CCNC: 45 U/L — HIGH (ref 4–40)
BILIRUB SERPL-MCNC: 0.6 MG/DL — SIGNIFICANT CHANGE UP (ref 0.2–1.2)
BUN SERPL-MCNC: 20 MG/DL — SIGNIFICANT CHANGE UP (ref 7–23)
CALCIUM SERPL-MCNC: 9.4 MG/DL — SIGNIFICANT CHANGE UP (ref 8.4–10.5)
CHLORIDE SERPL-SCNC: 107 MMOL/L — SIGNIFICANT CHANGE UP (ref 98–107)
CO2 SERPL-SCNC: 29 MMOL/L — SIGNIFICANT CHANGE UP (ref 22–31)
CREAT SERPL-MCNC: 0.88 MG/DL — SIGNIFICANT CHANGE UP (ref 0.5–1.3)
EGFR: 90 ML/MIN/1.73M2 — SIGNIFICANT CHANGE UP
GLUCOSE SERPL-MCNC: 80 MG/DL — SIGNIFICANT CHANGE UP (ref 70–99)
HCT VFR BLD CALC: 38.8 % — LOW (ref 39–50)
HGB BLD-MCNC: 12.1 G/DL — LOW (ref 13–17)
MCHC RBC-ENTMCNC: 27.3 PG — SIGNIFICANT CHANGE UP (ref 27–34)
MCHC RBC-ENTMCNC: 31.2 GM/DL — LOW (ref 32–36)
MCV RBC AUTO: 87.4 FL — SIGNIFICANT CHANGE UP (ref 80–100)
NRBC # BLD: 0 /100 WBCS — SIGNIFICANT CHANGE UP (ref 0–0)
NRBC # FLD: 0 K/UL — SIGNIFICANT CHANGE UP (ref 0–0)
PLATELET # BLD AUTO: 177 K/UL — SIGNIFICANT CHANGE UP (ref 150–400)
POTASSIUM SERPL-MCNC: 4.4 MMOL/L — SIGNIFICANT CHANGE UP (ref 3.5–5.3)
POTASSIUM SERPL-SCNC: 4.4 MMOL/L — SIGNIFICANT CHANGE UP (ref 3.5–5.3)
PROT SERPL-MCNC: 6.9 G/DL — SIGNIFICANT CHANGE UP (ref 6–8.3)
RBC # BLD: 4.44 M/UL — SIGNIFICANT CHANGE UP (ref 4.2–5.8)
RBC # FLD: 13.5 % — SIGNIFICANT CHANGE UP (ref 10.3–14.5)
SODIUM SERPL-SCNC: 142 MMOL/L — SIGNIFICANT CHANGE UP (ref 135–145)
WBC # BLD: 6.89 K/UL — SIGNIFICANT CHANGE UP (ref 3.8–10.5)
WBC # FLD AUTO: 6.89 K/UL — SIGNIFICANT CHANGE UP (ref 3.8–10.5)

## 2023-03-08 PROCEDURE — 93010 ELECTROCARDIOGRAM REPORT: CPT

## 2023-03-08 RX ORDER — PREGABALIN 225 MG/1
5 CAPSULE ORAL
Qty: 0 | Refills: 0 | DISCHARGE

## 2023-03-08 RX ORDER — LOSARTAN POTASSIUM 100 MG/1
1 TABLET, FILM COATED ORAL
Qty: 0 | Refills: 0 | DISCHARGE

## 2023-03-08 RX ORDER — CABERGOLINE 0.5 MG/1
1 TABLET ORAL
Qty: 0 | Refills: 0 | DISCHARGE

## 2023-03-08 NOTE — H&P PST ADULT - NS MD HP INPLANTS MED DEV
Cardiac stents reported, copy of card not in chart, pt states does not have one (x6 stents)/Vascular stents/Clips

## 2023-03-08 NOTE — H&P PST ADULT - NSANTHOSAYNRD_GEN_A_CORE
Denies sleep studies done in the past/No. DILCIA screening performed.  STOP BANG Legend: 0-2 = LOW Risk; 3-4 = INTERMEDIATE Risk; 5-8 = HIGH Risk

## 2023-03-08 NOTE — H&P PST ADULT - ASSESSMENT
71 y/o [place], w/ a dx of unilateral inguinal hernia w/obstruction w/o gangrene not recurrent, encysted hydrocele and evaluated for a scheduled left hydrocele, left IHR on 3/16/23

## 2023-03-08 NOTE — H&P PST ADULT - NSICDXPASTSURGICALHX_GEN_ALL_CORE_FT
PAST SURGICAL HISTORY:  H/O foot surgery R    H/O heart artery stent     H/O inguinal hernia repair

## 2023-03-08 NOTE — H&P PST ADULT - PROBLEM SELECTOR PLAN 1
Preop instructions provided including npo status and GI prophylaxis.   Pt to c/w current meds as ordered, Qam losartan/ amlodipine/ levothyroxine and deana ltake in am dos. c/w Qpm statin, Rapaflo and cabergoline as ordered twice per wk (Sunday and Wednesday reported).   Aware to stop any NSAIDS, OTC herbals or MVI on 3/9/23. Verbalized understanding.   BW done, pending results. DVT prophylaxis as per primary team.   MC pending as per surgeons request, form provided. Has an appt tomorrow.  CC not done but states spoke to cardiologist and will clear him if necessary w/o an appointment. Pt has cardiac stents x 6, card not available and is on ASA. I Called Dr Waller's office and left a message (directed to a ). Clearance w/ ASA clarification to be faxed to [facility] and left fax number, PST to fu. Pt. aware to f/u on covid testing prior to sx as per pst protocol and has an appt. at a [facility] site in [facility]. DILCIA precautions to be maintained and documented.

## 2023-03-08 NOTE — H&P PST ADULT - HISTORY OF PRESENT ILLNESS
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 Presents to PST w/ a preop dx of unilateral inguinal hernia w/obstruction w/o gangrene not recurrent, encysted hydrocele and to be evaluated for a scheduled left hydrocele, left IHR on 3/16/23.

## 2023-03-08 NOTE — H&P PST ADULT - NEGATIVE GENERAL GENITOURINARY SYMPTOMS
no hematuria/no renal colic/no flank pain L/no flank pain R/no urine discoloration/no gas in urine/no bladder infections/no incontinence/no dysuria/no urinary hesitancy/normal urinary frequency/normal libido

## 2023-03-08 NOTE — H&P PST ADULT - NSICDXPASTMEDICALHX_GEN_ALL_CORE_FT
PAST MEDICAL HISTORY:  BPH (benign prostatic hypertrophy)     CAD (coronary artery disease)     GERD (gastroesophageal reflux disease)     HTN (hypertension)     Hypothyroidism     Leg edema     Peripheral neuropathy     Pituitary adenoma treated with meds    Prostatism     Stented coronary artery 2015    Varicose vein     Venous insufficiency

## 2023-03-09 ENCOUNTER — APPOINTMENT (OUTPATIENT)
Dept: INTERNAL MEDICINE | Facility: CLINIC | Age: 73
End: 2023-03-09
Payer: COMMERCIAL

## 2023-03-09 VITALS
OXYGEN SATURATION: 99 % | WEIGHT: 197 LBS | HEART RATE: 73 BPM | SYSTOLIC BLOOD PRESSURE: 126 MMHG | BODY MASS INDEX: 36.25 KG/M2 | HEIGHT: 62 IN | DIASTOLIC BLOOD PRESSURE: 73 MMHG

## 2023-03-09 DIAGNOSIS — I25.10 ATHEROSCLEROTIC HEART DISEASE OF NATIVE CORONARY ARTERY W/OUT ANGINA PECTORIS: ICD-10-CM

## 2023-03-09 DIAGNOSIS — I10 ESSENTIAL (PRIMARY) HYPERTENSION: ICD-10-CM

## 2023-03-09 PROCEDURE — 99214 OFFICE O/P EST MOD 30 MIN: CPT

## 2023-03-09 RX ORDER — LORAZEPAM 0.5 MG/1
0.5 TABLET ORAL 3 TIMES DAILY
Qty: 90 | Refills: 0 | Status: DISCONTINUED | COMMUNITY
Start: 2021-10-15 | End: 2023-03-09

## 2023-03-09 NOTE — ASSESSMENT
[Patient Optimized for Surgery] : Patient optimized for surgery [FreeTextEntry4] : Patient was seen and examined for medical clearance. A full H and P was performed. Vitals taken.\par \par I spent a total of 30 minutes with this patient including face to face and non face to face time.\par

## 2023-03-09 NOTE — HISTORY OF PRESENT ILLNESS
[Coronary Artery Disease] : coronary artery disease [No Adverse Anesthesia Reaction] : no adverse anesthesia reaction in self or family member [(Patient denies any chest pain, claudication, dyspnea on exertion, orthopnea, palpitations or syncope)] : Patient denies any chest pain, claudication, dyspnea on exertion, orthopnea, palpitations or syncope [Aortic Stenosis] : no aortic stenosis [Atrial Fibrillation] : no atrial fibrillation [Recent Myocardial Infarction] : no recent myocardial infarction [Implantable Device/Pacemaker] : no implantable device/pacemaker [Asthma] : no asthma [COPD] : no COPD [Sleep Apnea] : no sleep apnea [Smoker] : not a smoker [Chronic Kidney Disease] : no chronic kidney disease [Diabetes] : no diabetes [FreeTextEntry1] : left hernia and hydrocele [FreeTextEntry2] : 03/16/23 [FreeTextEntry3] : Dr Murphy [FreeTextEntry4] : 72 year old male with a history of ASHD, cardiac stents, htn here today for pre op clearance for a hernia repair and hydrocele repair. He is overall feeling well with no new issues.

## 2023-03-15 ENCOUNTER — TRANSCRIPTION ENCOUNTER (OUTPATIENT)
Age: 73
End: 2023-03-15

## 2023-03-16 ENCOUNTER — TRANSCRIPTION ENCOUNTER (OUTPATIENT)
Age: 73
End: 2023-03-16

## 2023-03-16 ENCOUNTER — OUTPATIENT (OUTPATIENT)
Dept: OUTPATIENT SERVICES | Facility: HOSPITAL | Age: 73
LOS: 1 days | Discharge: HOME | End: 2023-03-16
Payer: COMMERCIAL

## 2023-03-16 VITALS
HEART RATE: 82 BPM | OXYGEN SATURATION: 98 % | SYSTOLIC BLOOD PRESSURE: 112 MMHG | DIASTOLIC BLOOD PRESSURE: 61 MMHG | RESPIRATION RATE: 16 BRPM

## 2023-03-16 VITALS
OXYGEN SATURATION: 99 % | TEMPERATURE: 98 F | HEIGHT: 68.5 IN | DIASTOLIC BLOOD PRESSURE: 73 MMHG | SYSTOLIC BLOOD PRESSURE: 128 MMHG | HEART RATE: 78 BPM | WEIGHT: 195.11 LBS | RESPIRATION RATE: 18 BRPM

## 2023-03-16 DIAGNOSIS — K40.30 UNILATERAL INGUINAL HERNIA, WITH OBSTRUCTION, WITHOUT GANGRENE, NOT SPECIFIED AS RECURRENT: ICD-10-CM

## 2023-03-16 DIAGNOSIS — Z98.890 OTHER SPECIFIED POSTPROCEDURAL STATES: Chronic | ICD-10-CM

## 2023-03-16 DIAGNOSIS — Z95.5 PRESENCE OF CORONARY ANGIOPLASTY IMPLANT AND GRAFT: Chronic | ICD-10-CM

## 2023-03-16 DIAGNOSIS — Z98.89 OTHER SPECIFIED POSTPROCEDURAL STATES: Chronic | ICD-10-CM

## 2023-03-16 PROCEDURE — 88302 TISSUE EXAM BY PATHOLOGIST: CPT | Mod: 26

## 2023-03-16 DEVICE — MESH HERNIA PERFIX PLUG SMALL 1 X 1.35": Type: IMPLANTABLE DEVICE | Site: LEFT | Status: FUNCTIONAL

## 2023-03-16 RX ORDER — OXYCODONE HYDROCHLORIDE 5 MG/1
1 TABLET ORAL
Qty: 7 | Refills: 0
Start: 2023-03-16

## 2023-03-16 NOTE — BRIEF OPERATIVE NOTE - OPERATION/FINDINGS
Repair of left indirect inguinal hernia, suture ligation of hernia sac, excision of lipoma. Hydrocelectomy performed, testicles replaced in scrotum. Plug placed in internal ring, secured with prolene. Floor of canal repaired primarily with running prolene. External oblique, fascia closed in layers using vicryl.

## 2023-03-16 NOTE — BRIEF OPERATIVE NOTE - NSICDXBRIEFPROCEDURE_GEN_ALL_CORE_FT
PROCEDURES:  Open repair of recurrent inguinal hernia using suture 16-Mar-2023 12:05:17  Maria Victoria Biswas  Left hydrocelectomy 16-Mar-2023 12:05:45  Maria Victoria Biswas

## 2023-03-16 NOTE — ASU DISCHARGE PLAN (ADULT/PEDIATRIC) - CARE PROVIDER_API CALL
Mina Murphy)  ColonRectal Surgery; Surgery  [address] [facility] [address]  [place] [address]  Phone: [phone]  Fax: [phone]  Follow Up Time: Routine

## 2023-03-16 NOTE — ASU PREOP CHECKLIST - PATIENT'S PERSONAL PROPERTY GIVEN TO
Problem: GASTROINTESTINAL - PEDIATRIC  Goal: Minimal or absence of nausea and/or vomiting  Description: INTERVENTIONS:  - Administer IV fluids as ordered to ensure adequate hydration  - Administer ordered antiemetic medications as needed  - Provide nonpharmacologic comfort measures as appropriate  - Advance diet as tolerated, if ordered  - Nutrition services referral to assist patient with adequate nutrition and appropriate food choices  Outcome: Progressing  Goal: Maintains adequate nutritional intake  Description: INTERVENTIONS:  - Monitor percentage of each meal consumed  - Identify factors contributing to decreased intake, treat as appropriate  - Assist with meals as needed  - Monitor I&O, and WT   - Obtain nutritional services referral as needed  Outcome: Not Progressing     Problem: PAIN - PEDIATRIC  Goal: Verbalizes/displays adequate comfort level or baseline comfort level  Description: Interventions:  - Encourage patient to monitor pain and request assistance  - Assess pain using appropriate pain scale  - Administer analgesics based on type and severity of pain and evaluate response  - Implement non-pharmacological measures as appropriate and evaluate response  - Consider cultural and social influences on pain and pain management  - Notify physician/advanced practitioner if interventions unsuccessful or patient reports new pain  Outcome: Progressing     Problem: THERMOREGULATION - /PEDIATRICS  Goal: Maintains normal body temperature  Description: Interventions:  - Monitor temperature (axillary for Newborns) as ordered  - Monitor for signs of hypothermia or hyperthermia  - Provide thermal support measures  - Wean to open crib when appropriate  Outcome: Progressing     Problem: SAFETY -   Goal: Patient will remain free from falls  Description: INTERVENTIONS:  - Instruct family/caregiver on patient safety  - Keep incubator doors and portholes closed when unattended  - Keep radiant warmer side rails and crib rails up when unattended  - Based on caregiver fall risk screen, instruct family/caregiver to ask for assistance with transferring infant if caregiver noted to have fall risk factors  Outcome: Progressing on unit

## 2023-03-16 NOTE — BRIEF OPERATIVE NOTE - NSICDXBRIEFPOSTOP_GEN_ALL_CORE_FT
POST-OP DIAGNOSIS:  Inguinal hernia 16-Mar-2023 12:06:24  Maria Victoria Biswas  Left hydrocele 16-Mar-2023 12:06:22  Maria Victoria Biswas

## 2023-03-16 NOTE — ASU DISCHARGE PLAN (ADULT/PEDIATRIC) - DISCHARGE PLAN IS COMPLETE AND GIVEN TO PATIENT
Quality 431: Preventive Care And Screening: Unhealthy Alcohol Use - Screening: Patient screened for unhealthy alcohol use using a single question and scores less than 2 times per year : Yes

## 2023-03-16 NOTE — ASU DISCHARGE PLAN (ADULT/PEDIATRIC) - NS MD DC FALL RISK RISK
For information on Fall & Injury Prevention, visit: [email or web address] OR  [email or web address] OR  https://www.cdc.gov/steadi/patient.html

## 2023-03-16 NOTE — ASU PREOP CHECKLIST - SURGICAL CONSENT
Normal exam. Pap/HPV collected. SBE rev'd. Mammo/Breast US scheduled (dense breasts). Ordered screening colonoscopy again. Encouraged healthy eating, regular wt bearing and aerobic exercise.     Mirena for endometrial hyperplasia-- amenorrheic. Pt would like to continue Mirena for its lifespan ([id number]). Consider removal next year.     Derm referral for back skin tags.     
done

## 2023-03-16 NOTE — BRIEF OPERATIVE NOTE - NSICDXBRIEFPREOP_GEN_ALL_CORE_FT
PRE-OP DIAGNOSIS:  Left hydrocele 16-Mar-2023 12:06:18  Maria Victoria Biswas  Inguinal hernia 16-Mar-2023 12:05:58  Maria Victoria Biswas

## 2023-03-21 DIAGNOSIS — D36.9 BENIGN NEOPLASM, UNSPECIFIED SITE: ICD-10-CM

## 2023-03-21 DIAGNOSIS — K29.70 GASTRITIS, UNSPECIFIED, W/OUT BLEEDING: ICD-10-CM

## 2023-03-27 LAB — SURGICAL PATHOLOGY STUDY: SIGNIFICANT CHANGE UP

## 2023-07-11 DIAGNOSIS — M25.561 PAIN IN RIGHT KNEE: ICD-10-CM

## 2023-07-12 ENCOUNTER — APPOINTMENT (OUTPATIENT)
Dept: ORTHOPEDIC SURGERY | Facility: CLINIC | Age: 73
End: 2023-07-12
Payer: COMMERCIAL

## 2023-07-12 VITALS — BODY MASS INDEX: 27.4 KG/M2 | WEIGHT: 185 LBS | HEIGHT: 69 IN

## 2023-07-12 DIAGNOSIS — M17.11 UNILATERAL PRIMARY OSTEOARTHRITIS, RIGHT KNEE: ICD-10-CM

## 2023-07-12 PROCEDURE — 73562 X-RAY EXAM OF KNEE 3: CPT | Mod: RT

## 2023-07-12 PROCEDURE — 99213 OFFICE O/P EST LOW 20 MIN: CPT

## 2023-08-09 ENCOUNTER — APPOINTMENT (OUTPATIENT)
Dept: INTERNAL MEDICINE | Facility: CLINIC | Age: 73
End: 2023-08-09
Payer: COMMERCIAL

## 2023-08-09 VITALS
BODY MASS INDEX: 29.33 KG/M2 | DIASTOLIC BLOOD PRESSURE: 70 MMHG | SYSTOLIC BLOOD PRESSURE: 120 MMHG | WEIGHT: 198 LBS | HEIGHT: 69 IN

## 2023-08-09 VITALS — SYSTOLIC BLOOD PRESSURE: 102 MMHG | DIASTOLIC BLOOD PRESSURE: 70 MMHG

## 2023-08-09 DIAGNOSIS — D64.9 ANEMIA, UNSPECIFIED: ICD-10-CM

## 2023-08-09 PROCEDURE — 99213 OFFICE O/P EST LOW 20 MIN: CPT

## 2023-08-09 PROCEDURE — 99203 OFFICE O/P NEW LOW 30 MIN: CPT

## 2023-08-09 RX ORDER — FINASTERIDE 5 MG/1
5 TABLET, FILM COATED ORAL
Refills: 0 | Status: ACTIVE | COMMUNITY

## 2023-08-09 RX ORDER — AMLODIPINE BESYLATE 5 MG/1
5 TABLET ORAL DAILY
Qty: 90 | Refills: 1 | Status: ACTIVE | COMMUNITY

## 2023-08-09 RX ORDER — ALENDRONATE SODIUM 70 MG/1
70 TABLET ORAL
Refills: 0 | Status: ACTIVE | COMMUNITY

## 2023-08-09 RX ORDER — TESTOSTERONE UNDECANOATE 198 MG/1
198 CAPSULE, LIQUID FILLED ORAL DAILY
Refills: 0 | Status: ACTIVE | COMMUNITY

## 2023-08-09 NOTE — PHYSICAL EXAM
[Well Nourished] : well nourished [Normal] : affect was normal and insight and judgment were intact [de-identified] : Short systolic murmur

## 2023-08-09 NOTE — ASSESSMENT
[FreeTextEntry1] : Patient actually looks clinically quite well.  He is on a testosterone supplement and his testosterone levels have risen he is also on finasteride which contributes to the level.  Although he does not state that he has any increase in energy level he does look much better and more animated  He has a history of hypertension his blood pressure interestingly enough is on the low side he has mild orthostasis we will stop his amlodipine  He has developed   osteopenia  he is now on Fosamax and testosterone supplementation   he has had a GI workup for what appears to be iron deficiency anemia he does need a pellet study  I will ask him to repeat his iron levels and let me know as there may be other etiologies for his anemia although these have been chronic I would be more confident if we had a specific etiology or if his arm levels remain low  He did have a short systolic murmur he will obtain an echocardiogram

## 2023-08-09 NOTE — HISTORY OF PRESENT ILLNESS
[FreeTextEntry1] :  this is a 73-year-old male for evaluation and treatment of his hypertension hypothyroidism neuropathy low testosterone [de-identified] : Patient states that he continues to be fatigued however he looks clinically much improved.  He still has his neuropathy

## 2023-08-10 VITALS — SYSTOLIC BLOOD PRESSURE: 92 MMHG | DIASTOLIC BLOOD PRESSURE: 70 MMHG

## 2023-10-09 NOTE — ED ADULT NURSE NOTE - CHIEF COMPLAINT QUOTE
Pt c/o right sided abdominal pain. Pt states he is being sent from Dr. Murphy office, diagnosed with a right inguinal incarcerated hernia. Pt states he is due for surgery in the morning. Denies n/v. Performing Laboratory: [phone] Performing Laboratory: -[phone]

## 2024-03-16 ENCOUNTER — TRANSCRIPTION ENCOUNTER (OUTPATIENT)
Age: 74
End: 2024-03-16

## 2024-03-17 ENCOUNTER — INPATIENT (INPATIENT)
Facility: HOSPITAL | Age: 74
LOS: 0 days | Discharge: HOME | End: 2024-03-18
Attending: STUDENT IN AN ORGANIZED HEALTH CARE EDUCATION/TRAINING PROGRAM | Admitting: STUDENT IN AN ORGANIZED HEALTH CARE EDUCATION/TRAINING PROGRAM
Payer: COMMERCIAL

## 2024-03-17 ENCOUNTER — TRANSCRIPTION ENCOUNTER (OUTPATIENT)
Age: 74
End: 2024-03-17

## 2024-03-17 VITALS
DIASTOLIC BLOOD PRESSURE: 80 MMHG | OXYGEN SATURATION: 99 % | TEMPERATURE: 99 F | SYSTOLIC BLOOD PRESSURE: 163 MMHG | RESPIRATION RATE: 19 BRPM | HEART RATE: 91 BPM

## 2024-03-17 DIAGNOSIS — Z95.5 PRESENCE OF CORONARY ANGIOPLASTY IMPLANT AND GRAFT: Chronic | ICD-10-CM

## 2024-03-17 DIAGNOSIS — Z98.890 OTHER SPECIFIED POSTPROCEDURAL STATES: Chronic | ICD-10-CM

## 2024-03-17 DIAGNOSIS — Z98.89 OTHER SPECIFIED POSTPROCEDURAL STATES: Chronic | ICD-10-CM

## 2024-03-17 DIAGNOSIS — K35.80 UNSPECIFIED ACUTE APPENDICITIS: ICD-10-CM

## 2024-03-17 LAB
ALBUMIN SERPL ELPH-MCNC: 4 G/DL — SIGNIFICANT CHANGE UP (ref 3.3–5)
ALP SERPL-CCNC: 65 U/L — SIGNIFICANT CHANGE UP (ref 40–120)
ALT FLD-CCNC: 34 U/L — SIGNIFICANT CHANGE UP (ref 4–41)
ANION GAP SERPL CALC-SCNC: 7 MMOL/L — SIGNIFICANT CHANGE UP (ref 7–14)
APPEARANCE UR: CLEAR — SIGNIFICANT CHANGE UP
APTT BLD: 29.7 SEC — SIGNIFICANT CHANGE UP (ref 24.5–35.6)
AST SERPL-CCNC: 30 U/L — SIGNIFICANT CHANGE UP (ref 4–40)
BASOPHILS # BLD AUTO: 0.03 K/UL — SIGNIFICANT CHANGE UP (ref 0–0.2)
BASOPHILS NFR BLD AUTO: 0.2 % — SIGNIFICANT CHANGE UP (ref 0–2)
BILIRUB SERPL-MCNC: 1 MG/DL — SIGNIFICANT CHANGE UP (ref 0.2–1.2)
BILIRUB UR-MCNC: NEGATIVE — SIGNIFICANT CHANGE UP
BLD GP AB SCN SERPL QL: NEGATIVE — SIGNIFICANT CHANGE UP
BUN SERPL-MCNC: 14 MG/DL — SIGNIFICANT CHANGE UP (ref 7–23)
CALCIUM SERPL-MCNC: 9.2 MG/DL — SIGNIFICANT CHANGE UP (ref 8.4–10.5)
CHLORIDE SERPL-SCNC: 104 MMOL/L — SIGNIFICANT CHANGE UP (ref 98–107)
CO2 SERPL-SCNC: 28 MMOL/L — SIGNIFICANT CHANGE UP (ref 22–31)
COLOR SPEC: YELLOW — SIGNIFICANT CHANGE UP
CREAT SERPL-MCNC: 0.98 MG/DL — SIGNIFICANT CHANGE UP (ref 0.5–1.3)
DIFF PNL FLD: NEGATIVE — SIGNIFICANT CHANGE UP
EGFR: 80 ML/MIN/1.73M2 — SIGNIFICANT CHANGE UP
EOSINOPHIL # BLD AUTO: 0.03 K/UL — SIGNIFICANT CHANGE UP (ref 0–0.5)
EOSINOPHIL NFR BLD AUTO: 0.2 % — SIGNIFICANT CHANGE UP (ref 0–6)
GLUCOSE SERPL-MCNC: 80 MG/DL — SIGNIFICANT CHANGE UP (ref 70–99)
GLUCOSE UR QL: NEGATIVE MG/DL — SIGNIFICANT CHANGE UP
HCT VFR BLD CALC: 43.4 % — SIGNIFICANT CHANGE UP (ref 39–50)
HGB BLD-MCNC: 14.4 G/DL — SIGNIFICANT CHANGE UP (ref 13–17)
IANC: 9.83 K/UL — HIGH (ref 1.8–7.4)
IMM GRANULOCYTES NFR BLD AUTO: 0.3 % — SIGNIFICANT CHANGE UP (ref 0–0.9)
INR BLD: 1.22 RATIO — HIGH (ref 0.85–1.18)
KETONES UR-MCNC: ABNORMAL MG/DL
LEUKOCYTE ESTERASE UR-ACNC: NEGATIVE — SIGNIFICANT CHANGE UP
LIDOCAIN IGE QN: 25 U/L — SIGNIFICANT CHANGE UP (ref 7–60)
LYMPHOCYTES # BLD AUTO: 1.45 K/UL — SIGNIFICANT CHANGE UP (ref 1–3.3)
LYMPHOCYTES # BLD AUTO: 11.9 % — LOW (ref 13–44)
MCHC RBC-ENTMCNC: 28.2 PG — SIGNIFICANT CHANGE UP (ref 27–34)
MCHC RBC-ENTMCNC: 33.2 GM/DL — SIGNIFICANT CHANGE UP (ref 32–36)
MCV RBC AUTO: 85.1 FL — SIGNIFICANT CHANGE UP (ref 80–100)
MONOCYTES # BLD AUTO: 0.84 K/UL — SIGNIFICANT CHANGE UP (ref 0–0.9)
MONOCYTES NFR BLD AUTO: 6.9 % — SIGNIFICANT CHANGE UP (ref 2–14)
NEUTROPHILS # BLD AUTO: 9.83 K/UL — HIGH (ref 1.8–7.4)
NEUTROPHILS NFR BLD AUTO: 80.5 % — HIGH (ref 43–77)
NITRITE UR-MCNC: NEGATIVE — SIGNIFICANT CHANGE UP
NRBC # BLD: 0 /100 WBCS — SIGNIFICANT CHANGE UP (ref 0–0)
NRBC # FLD: 0 K/UL — SIGNIFICANT CHANGE UP (ref 0–0)
PH UR: 7.5 — SIGNIFICANT CHANGE UP (ref 5–8)
PLATELET # BLD AUTO: 186 K/UL — SIGNIFICANT CHANGE UP (ref 150–400)
POTASSIUM SERPL-MCNC: 4.3 MMOL/L — SIGNIFICANT CHANGE UP (ref 3.5–5.3)
POTASSIUM SERPL-SCNC: 4.3 MMOL/L — SIGNIFICANT CHANGE UP (ref 3.5–5.3)
PROT SERPL-MCNC: 6.9 G/DL — SIGNIFICANT CHANGE UP (ref 6–8.3)
PROT UR-MCNC: NEGATIVE MG/DL — SIGNIFICANT CHANGE UP
PROTHROM AB SERPL-ACNC: 13.7 SEC — HIGH (ref 9.5–13)
RBC # BLD: 5.1 M/UL — SIGNIFICANT CHANGE UP (ref 4.2–5.8)
RBC # FLD: 13.3 % — SIGNIFICANT CHANGE UP (ref 10.3–14.5)
RH IG SCN BLD-IMP: POSITIVE — SIGNIFICANT CHANGE UP
SODIUM SERPL-SCNC: 139 MMOL/L — SIGNIFICANT CHANGE UP (ref 135–145)
SP GR SPEC: 1.02 — SIGNIFICANT CHANGE UP (ref 1–1.03)
UROBILINOGEN FLD QL: 0.2 MG/DL — SIGNIFICANT CHANGE UP (ref 0.2–1)
WBC # BLD: 12.22 K/UL — HIGH (ref 3.8–10.5)
WBC # FLD AUTO: 12.22 K/UL — HIGH (ref 3.8–10.5)

## 2024-03-17 PROCEDURE — 74177 CT ABD & PELVIS W/CONTRAST: CPT | Mod: 26,MC

## 2024-03-17 PROCEDURE — 88304 TISSUE EXAM BY PATHOLOGIST: CPT | Mod: 26

## 2024-03-17 PROCEDURE — 99285 EMERGENCY DEPT VISIT HI MDM: CPT

## 2024-03-17 DEVICE — STAPLER COVIDIEN TRI-STAPLE 45MM PURPLE RELOAD: Type: IMPLANTABLE DEVICE | Status: FUNCTIONAL

## 2024-03-17 RX ORDER — KETOROLAC TROMETHAMINE 30 MG/ML
30 SYRINGE (ML) INJECTION ONCE
Refills: 0 | Status: DISCONTINUED | OUTPATIENT
Start: 2024-03-17 | End: 2024-03-17

## 2024-03-17 RX ORDER — PREGABALIN 225 MG/1
5000 CAPSULE ORAL
Qty: 0 | Refills: 0 | DISCHARGE

## 2024-03-17 RX ORDER — FENTANYL CITRATE 50 UG/ML
25 INJECTION INTRAVENOUS
Refills: 0 | Status: DISCONTINUED | OUTPATIENT
Start: 2024-03-17 | End: 2024-03-18

## 2024-03-17 RX ORDER — LEVOTHYROXINE SODIUM 125 MCG
75 TABLET ORAL DAILY
Refills: 0 | Status: DISCONTINUED | OUTPATIENT
Start: 2024-03-17 | End: 2024-03-18

## 2024-03-17 RX ORDER — ENOXAPARIN SODIUM 100 MG/ML
40 INJECTION SUBCUTANEOUS EVERY 24 HOURS
Refills: 0 | Status: DISCONTINUED | OUTPATIENT
Start: 2024-03-17 | End: 2024-03-18

## 2024-03-17 RX ORDER — IBUPROFEN 200 MG
600 TABLET ORAL ONCE
Refills: 0 | Status: COMPLETED | OUTPATIENT
Start: 2024-03-17 | End: 2024-03-17

## 2024-03-17 RX ORDER — ACETAMINOPHEN 500 MG
975 TABLET ORAL EVERY 6 HOURS
Refills: 0 | Status: DISCONTINUED | OUTPATIENT
Start: 2024-03-17 | End: 2024-03-18

## 2024-03-17 RX ORDER — OXYCODONE HYDROCHLORIDE 5 MG/1
1 TABLET ORAL
Qty: 8 | Refills: 0
Start: 2024-03-17

## 2024-03-17 RX ORDER — SODIUM CHLORIDE 9 MG/ML
1000 INJECTION INTRAMUSCULAR; INTRAVENOUS; SUBCUTANEOUS ONCE
Refills: 0 | Status: COMPLETED | OUTPATIENT
Start: 2024-03-17 | End: 2024-03-17

## 2024-03-17 RX ORDER — PIPERACILLIN AND TAZOBACTAM 4; .5 G/20ML; G/20ML
3.38 INJECTION, POWDER, LYOPHILIZED, FOR SOLUTION INTRAVENOUS ONCE
Refills: 0 | Status: COMPLETED | OUTPATIENT
Start: 2024-03-17 | End: 2024-03-17

## 2024-03-17 RX ORDER — SODIUM CHLORIDE 9 MG/ML
1000 INJECTION, SOLUTION INTRAVENOUS
Refills: 0 | Status: DISCONTINUED | OUTPATIENT
Start: 2024-03-17 | End: 2024-03-18

## 2024-03-17 RX ORDER — ACETAMINOPHEN 500 MG
3 TABLET ORAL
Qty: 0 | Refills: 0 | DISCHARGE
Start: 2024-03-17

## 2024-03-17 RX ORDER — OXYCODONE HYDROCHLORIDE 5 MG/1
5 TABLET ORAL EVERY 4 HOURS
Refills: 0 | Status: DISCONTINUED | OUTPATIENT
Start: 2024-03-17 | End: 2024-03-18

## 2024-03-17 RX ORDER — ACETAMINOPHEN 500 MG
1000 TABLET ORAL EVERY 6 HOURS
Refills: 0 | Status: DISCONTINUED | OUTPATIENT
Start: 2024-03-17 | End: 2024-03-17

## 2024-03-17 RX ADMIN — FENTANYL CITRATE 25 MICROGRAM(S): 50 INJECTION INTRAVENOUS at 23:45

## 2024-03-17 RX ADMIN — Medication 400 MILLIGRAM(S): at 19:15

## 2024-03-17 RX ADMIN — SODIUM CHLORIDE 1000 MILLILITER(S): 9 INJECTION INTRAMUSCULAR; INTRAVENOUS; SUBCUTANEOUS at 13:13

## 2024-03-17 RX ADMIN — SODIUM CHLORIDE 120 MILLILITER(S): 9 INJECTION, SOLUTION INTRAVENOUS at 22:36

## 2024-03-17 RX ADMIN — PIPERACILLIN AND TAZOBACTAM 200 GRAM(S): 4; .5 INJECTION, POWDER, LYOPHILIZED, FOR SOLUTION INTRAVENOUS at 19:15

## 2024-03-17 RX ADMIN — Medication 600 MILLIGRAM(S): at 22:54

## 2024-03-17 RX ADMIN — Medication 30 MILLIGRAM(S): at 13:12

## 2024-03-17 RX ADMIN — FENTANYL CITRATE 25 MICROGRAM(S): 50 INJECTION INTRAVENOUS at 22:34

## 2024-03-17 NOTE — CONSULT NOTE ADULT - SUBJECTIVE AND OBJECTIVE BOX
CC: 73y old Male admitted with a chief complaint of RLQ pain    HPI: 73M PMHx HTN, HLD, GERD, CAD w/ stents, and recurrent inguinal hernia repair p/w acute onset RLQ pain since yesterday. Patient reports focal pain in RLQ and suprapubic region, but denies subjective fever/chills, CP, SOB, or n/v. Patient is having bowel function +/+ without signs of bleeding and hasn't noticed any acute urinary changes. States he has only taken Miralax since onset of pain without relief. He denies sick contacts at home.     PMHx:   Pituitary adenoma  HTN (hypertension)  Hypothyroidism  Pericarditis  GERD (gastroesophageal reflux disease)  Prostatism  Leg edema  Varicose vein  Stented coronary artery  CAD (coronary artery disease)  Peripheral neuropathy  BPH (benign prostatic hypertrophy)  Venous insufficiency      PSHx:   H/O foot surgery  H/O inguinal hernia repair  H/O heart artery stent      Medications (inpatient):   Medications (PRN):  Allergies: No Known Allergies  (Intolerances: )  Social Hx:   Family Hx: Family history of CHF (congestive heart failure)    Family history of diabetes mellitus        Physical Exam  T(C): 37  HR: 91 (91 - 91)  BP: 163/80 (163/80 - 163/80)  RR: 19 (19 - 19)  SpO2: 99% (99% - 99%)  Tmax: T(C): , Max: 37 (03-17-24 @ 11:02)    General Appearance: no acute distress, NTND   Chest: airway intact, non-labored breathing  CV: no JVD, palpable pulses b/l  Abdomen: soft, non-distended, mild TTP in RLQ/suprapubic region, no guarding, no palpable massess/bulges in inguinal region  Extremities: WWP     Labs:                        14.4   12.22 )-----------( 186      ( 17 Mar 2024 13:01 )             43.4     PT/INR - ( 17 Mar 2024 13:01 )   PT: 13.7 sec;   INR: 1.22 ratio         PTT - ( 17 Mar 2024 13:01 )  PTT:29.7 sec                Imaging and other studies:

## 2024-03-17 NOTE — ED PROVIDER NOTE - CLINICAL SUMMARY MEDICAL DECISION MAKING FREE TEXT BOX
73-year-old male past medical history of hernias presenting due to right lower quadrant abdominal pain with past day.  Patient is afebrile in the emergency department and vital signs normal.  On exam patient has right lower quadrant abdominal pain to palpation.  Concern for appendicitis.  Will order CBC, CMP, coags, type and screen, lipase, CT abdomen pelvis with IV contrast evaluate for this. Jj att: 73-year-old male past medical history of hernias presenting due to right lower quadrant abdominal pain with past day.  Patient is afebrile in the emergency department and vital signs normal.  On exam patient has right lower quadrant abdominal pain to palpation.  Concern for appendicitis.  Will order CBC, CMP, coags, type and screen, lipase, CT abdomen pelvis with IV contrast evaluate for this.

## 2024-03-17 NOTE — ED PROVIDER NOTE - OBJECTIVE STATEMENT
73-year-old male past medical history of hypertension, hyperlipidemia, stents, and hernias presenting the emergency department due to right lower quadrant abdominal pain that started 24 hours ago.  Patient states he thought was constipation, took MiraLAX and had loose stools with no relief of the pain.  Patient denies any nausea, vomiting, diarrhea.  Last bowel movement was today in the afternoon patient denies any blood.  No fevers, chills, body aches, nausea, vomiting or diarrhea.

## 2024-03-17 NOTE — H&P ADULT - ATTENDING COMMENTS
DATE OF SERVICE: 03-17-24 @ 20:06    73M with PMHx as above, here with 1d of RLQ abdominal pain. Started periumbilical and migrated to RLQ. No f/c.  WBC 12  Afebrile  CT with acute appendicitis   Abd soft, +RLQ TTP, no rebound/guarding    To OR for lap appy, poss open  R/B/A explained, pt agrees to proceed  IV abx  NPO/IVF

## 2024-03-17 NOTE — ED ADULT TRIAGE NOTE - CHIEF COMPLAINT QUOTE
Pt arrives ambulatory to triage c/o RLQ abd pain x1 day. Denies N/V/D or fevers. PMHx: HTN, GERD, CAD x3 stents.

## 2024-03-17 NOTE — H&P ADULT - NSHPPHYSICALEXAM_GEN_ALL_CORE
Vital Signs Last 24 Hrs  T(C): 36.7 (17 Mar 2024 15:47), Max: 37 (17 Mar 2024 11:02)  T(F): 98 (17 Mar 2024 15:47), Max: 98.6 (17 Mar 2024 11:02)  HR: 78 (17 Mar 2024 15:47) (78 - 91)  BP: 127/72 (17 Mar 2024 15:47) (127/72 - 163/80)  BP(mean): --  RR: 18 (17 Mar 2024 15:47) (18 - 19)  SpO2: 100% (17 Mar 2024 15:47) (99% - 100%)    Parameters below as of 17 Mar 2024 15:47  Patient On (Oxygen Delivery Method): room air      PHYSICAL EXAM:  General Appearance: no acute distress  Chest: airway intact, non-labored breathing  CV: no JVD, palpable pulses b/l  Abdomen: soft, non-distended, mild TTP in RLQ/suprapubic region, no guarding, no palpable massess/bulges in inguinal region  Extremities: WWP Vital Signs Last 24 Hrs  T(C): 36.7 (17 Mar 2024 15:47), Max: 37 (17 Mar 2024 11:02)  T(F): 98 (17 Mar 2024 15:47), Max: 98.6 (17 Mar 2024 11:02)  HR: 78 (17 Mar 2024 15:47) (78 - 91)  BP: 127/72 (17 Mar 2024 15:47) (127/72 - 163/80)  BP(mean): --  RR: 18 (17 Mar 2024 15:47) (18 - 19)  SpO2: 100% (17 Mar 2024 15:47) (99% - 100%)    Parameters below as of 17 Mar 2024 15:47  Patient On (Oxygen Delivery Method): room air      PHYSICAL EXAM:  General Appearance: no acute distress  Chest: airway intact, non-labored breathing  CV: no JVD, palpable pulses b/l  Abdomen: soft, non-distended, mild tenderness in RLQ/suprapubic region, no guarding, no palpable massess/bulges in inguinal region  Extremities: WWP

## 2024-03-17 NOTE — BRIEF OPERATIVE NOTE - NSICDXBRIEFPROCEDURE_GEN_ALL_CORE_FT
PROCEDURES:  Laparoscopic appendectomy 17-Mar-2024 21:44:51  Diana Frank  Repair, hernia, umbilical, with lipectomy 17-Mar-2024 21:46:49  Diana Frank

## 2024-03-17 NOTE — ASU DISCHARGE PLAN (ADULT/PEDIATRIC) - NURSING INSTRUCTIONS
Last dose of TYLENOL for pain management was at 7PM. Next dose of TYLENOL may be taken at or after 1AM if needed. DO NOT take any additional products containing TYLENOL or ACETAMINOPHEN, such as VICODIN, PERCOCET, NORCO, EXCEDRIN, and any over-the-counter cold medications. DO NOT CONSUME MORE THAN 1241-6183 MG OF TYLENOL (acetaminophen) in a 24-hour period. Last dose of TYLENOL for pain management was at 7PM. Next dose of TYLENOL may be taken at or after 1AM if needed. DO NOT take any additional products containing TYLENOL or ACETAMINOPHEN, such as VICODIN, PERCOCET, NORCO, EXCEDRIN, and any over-the-counter cold medications. DO NOT CONSUME MORE THAN 4710-6679 MG OF TYLENOL (acetaminophen) in a 24-hour period.  As part of your pain regimen, you are able to take Motrin every 6hrs as needed. You were last given Motrin at 11PM. Please do not take your next Motrin until 5AM

## 2024-03-17 NOTE — CONSULT NOTE ADULT - ASSESSMENT
73M PMHx HTN, HLD, GERD, CAD w/ stents, and recurrent inguinal hernia repair p/w acute onset RLQ pain since yesterday.    Rec:  - Patient of Dr. Murphy/Tito   - F/u CBC/CMP  - F/u CTAP   - Plan incomplete pending discussion with Dr. Francis ***      A Team [id number]

## 2024-03-17 NOTE — ED ADULT NURSE NOTE - NSFALLUNIVINTERV_ED_ALL_ED
Bed/Stretcher in lowest position, wheels locked, appropriate side rails in place/Call bell, personal items and telephone in reach/Instruct patient to call for assistance before getting out of bed/chair/stretcher/Non-slip footwear applied when patient is off stretcher/Cardinal to call system/Physically safe environment - no spills, clutter or unnecessary equipment/Purposeful proactive rounding/Room/bathroom lighting operational, light cord in reach

## 2024-03-17 NOTE — ASU DISCHARGE PLAN (ADULT/PEDIATRIC) - NS MD DC FALL RISK RISK
For information on Fall & Injury Prevention, visit: [email or web address]/[email or web address] OR  [email or web address].[email or web address]/[email or web address]-to-avoid-injury OR  https://www.cdc.gov/steadi/patient.html

## 2024-03-17 NOTE — H&P ADULT - ASSESSMENT
73M PMHx HTN, HLD, GERD, CAD w/ stents, and recurrent inguinal hernia repair p/w acute appendicitis    Rec:  - Admit to Dr. Francis  - Add on for lap appendectomy  - IV abx (zosyn)  - pain control prn  - dvt ppx  - NPO/IVF    Discussed w/ Dr. Francis    A Team [id number]

## 2024-03-17 NOTE — ED PROVIDER NOTE - CHIEF COMPLAINT
Reassess and send fo rneuropsych tetsing    The patient is a 73y Male complaining of abdominal pain.

## 2024-03-17 NOTE — ED ADULT NURSE NOTE - DOES PATIENT HAVE ADVANCE DIRECTIVE
unknown Minocycline Pregnancy And Lactation Text: This medication is Pregnancy Category D and not consider safe during pregnancy. It is also excreted in breast milk.

## 2024-03-17 NOTE — BRIEF OPERATIVE NOTE - PRIMARY SURGEON
HPI: 72M with PMH as listed admitted  with acute shortness of breath. Patient found to have fever and imaging revealed multifocal pneumonia. Patient initially on bipap, but transitioned to high flow nasal cannula. ICU consulted but declined patient as he was tolerating high flow.       PERTINENT PMH REVIEWED: Yes     PAST MEDICAL & SURGICAL HISTORY:  Esophageal stricture  Prostate CA  History of carotid stenosis  Laryngeal carcinoma  COPD (chronic obstructive pulmonary disease)  Hypothyroidism  Aspiration pneumonia  Traumatic pneumothorax  Benign prostatic hyperplasia with urinary obstruction  Syncope and collapse  Microalbuminuria  Anemia  Hyperlipidemia, unspecified hyperlipidemia type  Femoral fracture  S/P percutaneous endoscopic gastrostomy (PEG) tube placement  Liver laceration  History of exploratory laparotomy  History of total bilateral knee replacement    SOCIAL HISTORY:                      Substance history: former smoker                     Admitted from:  home                      Restorationism/spirituality:                    Cultural concerns: none                       Surrogate - wife [facility] [phone]     FAMILY HISTORY:  Family history of hypertension (Mother)  Family history of cerebrovascular accident (CVA) (Mother)    Allergies    No Known Allergies    ADVANCE DIRECTIVES/TREATMENT PREFERENCES:  Full code, all aggressive measures desired     Baseline ADLs (prior to admission):   Dependent      Karnofsky/Palliative Performance Status Version 2:  %  http://npcrc.org/files/news/palliative_performance_scale_ppsv2.pdf    Present Symptoms:     Dyspnea: Mild Moderate Severe  Nausea/Vomiting: Yes No  Anxiety:  Yes No  Depression: Yes No  Fatigue: Yes No  Loss of appetite: Yes No  Constipation:     Pain:             Character-            Duration-            Effect-            Factors-            Frequency-            Location-            Severity-    Pain AD Score:  [email or web address] (press ctrl + left click to view)    Review of Systems: Reviewed                     Negative:                     Positive:  Unable to obtain due to poor mentation   All others negative    MEDICATIONS  (STANDING):  aspirin  chewable 81 milliGRAM(s) Oral daily  atorvastatin 40 milliGRAM(s) Oral at bedtime  cefepime   IVPB 2000 milliGRAM(s) IV Intermittent every 12 hours  cefepime   IVPB      levothyroxine 112 MICROGram(s) Oral daily  lidocaine   4% Patch 1 Patch Transdermal daily  metoprolol tartrate 12.5 milliGRAM(s) Oral two times a day  oxybutynin 5 milliGRAM(s) Oral daily  pantoprazole   Suspension 40 milliGRAM(s) Oral daily  predniSONE   Tablet 5 milliGRAM(s) Oral daily  tiotropium 18 MICROgram(s) Capsule 1 Capsule(s) Inhalation daily    MEDICATIONS  (PRN):    PHYSICAL EXAM:    Vital Signs Last 24 Hrs  T(C): 37.3 (2022 11:21), Max: 37.3 (2022 08:04)  T(F): 99.1 (2022 11:21), Max: 99.1 (2022 08:04)  HR: 113 (2022 11:21) (94 - 125)  BP: 136/63 (2022 11:21) (108/51 - 198/80)  BP(mean): --  RR: 20 (2022 11:21) (20 - 24)  SpO2: 92% (2022 11:21) (92% - 95%)    General: alert  oriented x ____ lethargic agitated delirious                  cachexia  nonverbal  coma    HEENT: normal  dry mouth  ET tube/trach    Lungs: comfortable tachypnea/labored breathing  excessive secretions    CV: normal  tachycardia    GI: normal  distended  tender  no BS               PEG/NG/OG tube  constipation  last BM:     : normal  incontinent  oliguria/anuria  albright    MSK: normal  weakness  edema             ambulatory  bedbound/wheelchair bound    Neuro: no focal deficits cognitive impairment dysphagia dysarthria hemiplegia     Skin: normal  pressure ulcers- Stage_____  no rash    LABS:                      9.8    11.51 )-----------( 184      ( 2022 07:56 )             31.9     01-12    140  |  99  |  43.2<H>  ----------------------------<  109<H>  4.5   |  30.0<H>  |  1.08    Ca    8.9      2022 07:56    TPro  9.3<H>  /  Alb  3.9  /  TBili  0.3<L>  /  DBili  x   /  AST  38  /  ALT  25  /  AlkPhos  90  -11    PT/INR - ( 2022 09:35 )   PT: 13.3 sec;   INR: 1.15 ratio       PTT - ( 2022 09:35 )  PTT:26.5 sec  Urinalysis Basic - ( 2022 12:27 )    Color: Yellow / Appearance: Clear / S.010 / pH: x  Gluc: x / Ketone: Negative  / Bili: Negative / Urobili: Negative mg/dL   Blood: x / Protein: 15 / Nitrite: Negative   Leuk Esterase: Negative / RBC: 0-2 /HPF / WBC 0-2   Sq Epi: x / Non Sq Epi: Negative / Bacteria: x    I&O's Summary    2022 07:  -  2022 07:00  --------------------------------------------------------  IN: 800 mL / OUT: 0 mL / NET: 800 mL    2022 07:  -  2022 12:34  --------------------------------------------------------  IN: 567 mL / OUT: 300 mL / NET: 267 mL        RADIOLOGY & ADDITIONAL STUDIES:       HPI: 72M with PMH as listed admitted  with acute shortness of breath. Patient found to have fever and imaging revealed multifocal pneumonia. Patient initially on bipap, but transitioned to high flow nasal cannula. ICU consulted but declined patient as he was tolerating high flow.       PERTINENT PMH REVIEWED: Yes     PAST MEDICAL & SURGICAL HISTORY:  Esophageal stricture  Prostate CA  History of carotid stenosis  Laryngeal carcinoma  COPD (chronic obstructive pulmonary disease)  Hypothyroidism  Aspiration pneumonia  Traumatic pneumothorax  Benign prostatic hyperplasia with urinary obstruction  Syncope and collapse  Microalbuminuria  Anemia  Hyperlipidemia, unspecified hyperlipidemia type  Femoral fracture  S/P percutaneous endoscopic gastrostomy (PEG) tube placement  Liver laceration  History of exploratory laparotomy  History of total bilateral knee replacement    SOCIAL HISTORY:                      Substance history: former smoker                     Admitted from:  home                      Mormon/spirituality:                    Cultural concerns: none                       Surrogate - wife [facility] [phone]     FAMILY HISTORY:  Family history of hypertension (Mother)  Family history of cerebrovascular accident (CVA) (Mother)    Allergies    No Known Allergies    ADVANCE DIRECTIVES/TREATMENT PREFERENCES:  Full code, all aggressive measures desired     Baseline ADLs (prior to admission):   Dependent      Karnofsky/Palliative Performance Status Version 2:  %  http://npcrc.org/files/news/palliative_performance_scale_ppsv2.pdf    Present Symptoms:     Dyspnea: none   Nausea/Vomiting: No  Anxiety:  No  Depression: No  Fatigue: Yes   Loss of appetite: No  Constipation: none     Pain: none             Character-            Duration-            Effect-            Factors-            Frequency-            Location-            Severity-    Pain AD Score:  [email or web address] (press ctrl + left click to view)    Review of Systems: Reviewed                Unable to obtain due to poor mentation   All others negative    MEDICATIONS  (STANDING):  aspirin  chewable 81 milliGRAM(s) Oral daily  atorvastatin 40 milliGRAM(s) Oral at bedtime  cefepime   IVPB 2000 milliGRAM(s) IV Intermittent every 12 hours  cefepime   IVPB      levothyroxine 112 MICROGram(s) Oral daily  lidocaine   4% Patch 1 Patch Transdermal daily  metoprolol tartrate 12.5 milliGRAM(s) Oral two times a day  oxybutynin 5 milliGRAM(s) Oral daily  pantoprazole   Suspension 40 milliGRAM(s) Oral daily  predniSONE   Tablet 5 milliGRAM(s) Oral daily  tiotropium 18 MICROgram(s) Capsule 1 Capsule(s) Inhalation daily    MEDICATIONS  (PRN):    PHYSICAL EXAM:    Vital Signs Last 24 Hrs  T(C): 37.3 (2022 11:21), Max: 37.3 (2022 08:04)  T(F): 99.1 (2022 11:21), Max: 99.1 (2022 08:04)  HR: 113 (2022 11:21) (94 - 125)  BP: 136/63 (2022 11:21) (108/51 - 198/80)  BP(mean): --  RR: 20 (2022 11:21) (20 - 24)  SpO2: 92% (2022 11:21) (92% - 95%)    General: alert and oriented x 4     HEENT: normal     Lungs: comfortable    CV: normal      GI: normal     : normal     MSK: weakness     Neuro: no focal deficits     Skin: no rash    LABS:                      9.8    11.51 )-----------( 184      ( 2022 07:56 )             31.9     01-12    140  |  99  |  43.2<H>  ----------------------------<  109<H>  4.5   |  30.0<H>  |  1.08    Ca    8.9      2022 07:56    TPro  9.3<H>  /  Alb  3.9  /  TBili  0.3<L>  /  DBili  x   /  AST  38  /  ALT  25  /  AlkPhos  90  01-11    PT/INR - ( 2022 09:35 )   PT: 13.3 sec;   INR: 1.15 ratio       PTT - ( 2022 09:35 )  PTT:26.5 sec  Urinalysis Basic - ( 2022 12:27 )    Color: Yellow / Appearance: Clear / S.010 / pH: x  Gluc: x / Ketone: Negative  / Bili: Negative / Urobili: Negative mg/dL   Blood: x / Protein: 15 / Nitrite: Negative   Leuk Esterase: Negative / RBC: 0-2 /HPF / WBC 0-2   Sq Epi: x / Non Sq Epi: Negative / Bacteria: x    I&O's Summary    2022 07:01  -  2022 07:00  --------------------------------------------------------  IN: 800 mL / OUT: 0 mL / NET: 800 mL    2022 07:01  -  2022 12:34  --------------------------------------------------------  IN: 567 mL / OUT: 300 mL / NET: 267 mL    RADIOLOGY & ADDITIONAL STUDIES:    < from: CT Abdomen and Pelvis No Cont (22 @ 13:05) >  IMPRESSION:  Multifocal pneumonia. Bilateral lower lobe consolidations, dense on the   left along with adjacent extrapleural nodules that may reflect lymph   nodes. Recommend following to resolution to exclude underlying mass.    Nondilated, nonspecific fluid-filled loops of large and small bowel.   Correlate for enterocolitis.    --- End of Report ---    < end of copied text >       John Francis (Attending) <<----- Click to Select Surgeon

## 2024-03-17 NOTE — H&P ADULT - NSHPLABSRESULTS_GEN_ALL_CORE
CBC (03-17 @ 13:01)                              14.4                           12.22<H>  )----------------(  186        80.5<H>% Neutrophils, 11.9<L>% Lymphocytes, ANC: 9.83<H>                              43.4                  BMP (03-17 @ 13:01)             139     |  104     |  14    		Ca++ --      Ca 9.2                ---------------------------------( 80    		Mg --                 4.3     |  28      |  0.98  			Ph --        LFTs (03-17 @ 13:01)      TPro 6.9 / Alb 4.0 / TBili 1.0 / DBili -- / AST 30 / ALT 34 / AlkPhos 65    Coags (03-17 @ 13:01)  aPTT 29.7 / INR 1.22<H> / PT 13.7<H>        IMAGING:    < from: CT Abdomen and Pelvis w/ IV Cont (03.17.24 @ 17:19) >    FINDINGS:  LOWER CHEST: Coronary stents in place.    LIVER: Within normal limits.  BILE DUCTS: Normal caliber.  GALLBLADDER: Within normal limits.  SPLEEN: Within normal limits.  PANCREAS: Within normal limits.  ADRENALS: Within normal limits.  KIDNEYS/URETERS: Multiple cysts in the bilateral kidneys measuring up to   3 cm with additional subcentimeter hypodensities that are too small to   characterize. There is no hydronephrosis bilaterally.    BLADDER: Bladder wall thickening, likely due to decompression.  REPRODUCTIVE ORGANS: Prostate is enlarged.    BOWEL: No bowel obstruction. There is a rim-enhancing tubular structure   in the right lower quadrant adjacent to the cecum with surrounding fat   stranding which may represent acute appendicitis (series 301 image 83).  PERITONEUM: No ascites.  VESSELS: Atherosclerotic changes.  RETROPERITONEUM/LYMPH NODES: No lymphadenopathy.  ABDOMINAL WALL: Small fat-containing umbilical hernia.  BONES: Cystic changes in the left iliac wing. Dextroscoliosis ofthe   lumbar spine. Degenerative changes.    IMPRESSION:  There is a rim-enhancing tubular structure in the right lower quadrant 1   cm thick adjacent to the cecum with surrounding fat stranding which   likely represents acute appendicitis.    < end of copied text >

## 2024-03-17 NOTE — BRIEF OPERATIVE NOTE - OPERATION/FINDINGS
lap appendectomy endo gabriela 45mm purple staple at healthy base, hemostatic staple line  umbilical hernia repair with endo close suture passer 0-vicryl x 3 figure-of-8 sutures

## 2024-03-17 NOTE — ED ADULT NURSE REASSESSMENT NOTE - NS ED NURSE REASSESS COMMENT FT1
Patient is awake and alert, his wife is at his bedside. Patient NPO since  5 am today. Report given to OR RN .  Patient presently appears in no distress, awaiting transport to the OR.

## 2024-03-17 NOTE — ED ADULT NURSE NOTE - OBJECTIVE STATEMENT
Patient received to room int2. Patient is a&ox4 ambulatory c/o abdopminal pain. Patient stated that when pressing down it has tenderness. Patient awaiting for CT.

## 2024-03-17 NOTE — H&P ADULT - HISTORY OF PRESENT ILLNESS
73M PMHx HTN, HLD, GERD, CAD w/ stents, and recurrent inguinal hernia repair p/w acute onset RLQ pain since yesterday. Patient reports focal pain in RLQ and suprapubic region, but denies subjective fever/chills, CP, SOB, or n/v. Patient is having bowel function +/+ without signs of bleeding and hasn't noticed any acute urinary changes. States he has only taken Miralax since onset of pain without relief. He denies sick contacts at home.      73M PMHx HTN, HLD, GERD, CAD w/ stents, and recurrent inguinal hernia repair p/w acute onset RLQ pain since yesterday. Patient reports focal pain in RLQ and suprapubic region, but denies subjective fever/chills, CP, SOB, or n/v. Patient is having bowel function +/+ without signs of bleeding and hasn't noticed any acute urinary changes. States he has only taken Miralax since onset of pain without relief. He denies sick contacts at home.

## 2024-03-17 NOTE — ASU DISCHARGE PLAN (ADULT/PEDIATRIC) - CARE PROVIDER_API CALL
John Francis (DO)  Surgery  [address] [facility] [address], [address]  [place] [address]  Phone: [phone]  Fax: [phone]  Follow Up Time: 2 weeks

## 2024-03-18 VITALS
OXYGEN SATURATION: 98 % | TEMPERATURE: 98 F | RESPIRATION RATE: 20 BRPM | HEART RATE: 79 BPM | SYSTOLIC BLOOD PRESSURE: 126 MMHG | DIASTOLIC BLOOD PRESSURE: 70 MMHG

## 2024-03-18 LAB
CULTURE RESULTS: SIGNIFICANT CHANGE UP
SPECIMEN SOURCE: SIGNIFICANT CHANGE UP

## 2024-03-24 ENCOUNTER — INPATIENT (INPATIENT)
Facility: HOSPITAL | Age: 74
LOS: 1 days | Discharge: HOME | End: 2024-03-26
Attending: STUDENT IN AN ORGANIZED HEALTH CARE EDUCATION/TRAINING PROGRAM | Admitting: STUDENT IN AN ORGANIZED HEALTH CARE EDUCATION/TRAINING PROGRAM
Payer: COMMERCIAL

## 2024-03-24 VITALS
RESPIRATION RATE: 18 BRPM | SYSTOLIC BLOOD PRESSURE: 150 MMHG | OXYGEN SATURATION: 100 % | TEMPERATURE: 98 F | HEART RATE: 82 BPM | DIASTOLIC BLOOD PRESSURE: 79 MMHG

## 2024-03-24 DIAGNOSIS — R10.9 UNSPECIFIED ABDOMINAL PAIN: ICD-10-CM

## 2024-03-24 DIAGNOSIS — Z98.890 OTHER SPECIFIED POSTPROCEDURAL STATES: Chronic | ICD-10-CM

## 2024-03-24 DIAGNOSIS — Z95.5 PRESENCE OF CORONARY ANGIOPLASTY IMPLANT AND GRAFT: Chronic | ICD-10-CM

## 2024-03-24 DIAGNOSIS — Z98.89 OTHER SPECIFIED POSTPROCEDURAL STATES: Chronic | ICD-10-CM

## 2024-03-24 LAB
ALBUMIN SERPL ELPH-MCNC: 3.8 G/DL — SIGNIFICANT CHANGE UP (ref 3.3–5)
ALP SERPL-CCNC: 64 U/L — SIGNIFICANT CHANGE UP (ref 40–120)
ALT FLD-CCNC: 34 U/L — SIGNIFICANT CHANGE UP (ref 4–41)
ANION GAP SERPL CALC-SCNC: 12 MMOL/L — SIGNIFICANT CHANGE UP (ref 7–14)
APTT BLD: 31.8 SEC — SIGNIFICANT CHANGE UP (ref 24.5–35.6)
AST SERPL-CCNC: 46 U/L — HIGH (ref 4–40)
BASOPHILS # BLD AUTO: 0.03 K/UL — SIGNIFICANT CHANGE UP (ref 0–0.2)
BASOPHILS NFR BLD AUTO: 0.3 % — SIGNIFICANT CHANGE UP (ref 0–2)
BILIRUB SERPL-MCNC: 0.5 MG/DL — SIGNIFICANT CHANGE UP (ref 0.2–1.2)
BUN SERPL-MCNC: 17 MG/DL — SIGNIFICANT CHANGE UP (ref 7–23)
CALCIUM SERPL-MCNC: 9.7 MG/DL — SIGNIFICANT CHANGE UP (ref 8.4–10.5)
CHLORIDE SERPL-SCNC: 101 MMOL/L — SIGNIFICANT CHANGE UP (ref 98–107)
CO2 SERPL-SCNC: 25 MMOL/L — SIGNIFICANT CHANGE UP (ref 22–31)
CREAT SERPL-MCNC: 1 MG/DL — SIGNIFICANT CHANGE UP (ref 0.5–1.3)
EGFR: 78 ML/MIN/1.73M2 — SIGNIFICANT CHANGE UP
EOSINOPHIL # BLD AUTO: 0.17 K/UL — SIGNIFICANT CHANGE UP (ref 0–0.5)
EOSINOPHIL NFR BLD AUTO: 1.5 % — SIGNIFICANT CHANGE UP (ref 0–6)
GLUCOSE SERPL-MCNC: 92 MG/DL — SIGNIFICANT CHANGE UP (ref 70–99)
HCT VFR BLD CALC: 44.2 % — SIGNIFICANT CHANGE UP (ref 39–50)
HGB BLD-MCNC: 14.5 G/DL — SIGNIFICANT CHANGE UP (ref 13–17)
IANC: 8.44 K/UL — HIGH (ref 1.8–7.4)
IMM GRANULOCYTES NFR BLD AUTO: 0.9 % — SIGNIFICANT CHANGE UP (ref 0–0.9)
INR BLD: 1.13 RATIO — SIGNIFICANT CHANGE UP (ref 0.85–1.18)
LIDOCAIN IGE QN: 27 U/L — SIGNIFICANT CHANGE UP (ref 7–60)
LYMPHOCYTES # BLD AUTO: 1.56 K/UL — SIGNIFICANT CHANGE UP (ref 1–3.3)
LYMPHOCYTES # BLD AUTO: 14.2 % — SIGNIFICANT CHANGE UP (ref 13–44)
MCHC RBC-ENTMCNC: 28 PG — SIGNIFICANT CHANGE UP (ref 27–34)
MCHC RBC-ENTMCNC: 32.8 GM/DL — SIGNIFICANT CHANGE UP (ref 32–36)
MCV RBC AUTO: 85.3 FL — SIGNIFICANT CHANGE UP (ref 80–100)
MONOCYTES # BLD AUTO: 0.7 K/UL — SIGNIFICANT CHANGE UP (ref 0–0.9)
MONOCYTES NFR BLD AUTO: 6.4 % — SIGNIFICANT CHANGE UP (ref 2–14)
NEUTROPHILS # BLD AUTO: 8.44 K/UL — HIGH (ref 1.8–7.4)
NEUTROPHILS NFR BLD AUTO: 76.7 % — SIGNIFICANT CHANGE UP (ref 43–77)
NRBC # BLD: 0 /100 WBCS — SIGNIFICANT CHANGE UP (ref 0–0)
NRBC # FLD: 0 K/UL — SIGNIFICANT CHANGE UP (ref 0–0)
PLATELET # BLD AUTO: 221 K/UL — SIGNIFICANT CHANGE UP (ref 150–400)
POTASSIUM SERPL-MCNC: 4.8 MMOL/L — SIGNIFICANT CHANGE UP (ref 3.5–5.3)
POTASSIUM SERPL-SCNC: 4.8 MMOL/L — SIGNIFICANT CHANGE UP (ref 3.5–5.3)
PROT SERPL-MCNC: 7.2 G/DL — SIGNIFICANT CHANGE UP (ref 6–8.3)
PROTHROM AB SERPL-ACNC: 12.6 SEC — SIGNIFICANT CHANGE UP (ref 9.5–13)
RBC # BLD: 5.18 M/UL — SIGNIFICANT CHANGE UP (ref 4.2–5.8)
RBC # FLD: 13.6 % — SIGNIFICANT CHANGE UP (ref 10.3–14.5)
SODIUM SERPL-SCNC: 138 MMOL/L — SIGNIFICANT CHANGE UP (ref 135–145)
WBC # BLD: 11 K/UL — HIGH (ref 3.8–10.5)
WBC # FLD AUTO: 11 K/UL — HIGH (ref 3.8–10.5)

## 2024-03-24 PROCEDURE — 74177 CT ABD & PELVIS W/CONTRAST: CPT | Mod: 26,MC

## 2024-03-24 PROCEDURE — 99285 EMERGENCY DEPT VISIT HI MDM: CPT

## 2024-03-24 RX ORDER — LEVOTHYROXINE SODIUM 125 MCG
1 TABLET ORAL
Qty: 0 | Refills: 0 | DISCHARGE

## 2024-03-24 RX ORDER — LOSARTAN POTASSIUM 100 MG/1
1 TABLET, FILM COATED ORAL
Refills: 0 | DISCHARGE

## 2024-03-24 RX ORDER — KETOROLAC TROMETHAMINE 30 MG/ML
15 SYRINGE (ML) INJECTION ONCE
Refills: 0 | Status: DISCONTINUED | OUTPATIENT
Start: 2024-03-24 | End: 2024-03-24

## 2024-03-24 RX ORDER — SODIUM CHLORIDE 9 MG/ML
1000 INJECTION, SOLUTION INTRAVENOUS
Refills: 0 | Status: DISCONTINUED | OUTPATIENT
Start: 2024-03-24 | End: 2024-03-25

## 2024-03-24 RX ORDER — AMLODIPINE BESYLATE 2.5 MG/1
1 TABLET ORAL
Refills: 0 | DISCHARGE

## 2024-03-24 RX ORDER — AMLODIPINE BESYLATE 2.5 MG/1
1 TABLET ORAL
Qty: 0 | Refills: 0 | DISCHARGE

## 2024-03-24 RX ORDER — CABERGOLINE 0.5 MG/1
1 TABLET ORAL
Qty: 0 | Refills: 0 | DISCHARGE

## 2024-03-24 RX ORDER — LEVOTHYROXINE SODIUM 125 MCG
60 TABLET ORAL
Refills: 0 | Status: DISCONTINUED | OUTPATIENT
Start: 2024-03-24 | End: 2024-03-26

## 2024-03-24 RX ORDER — HEPARIN SODIUM 5000 [USP'U]/ML
5000 INJECTION INTRAVENOUS; SUBCUTANEOUS EVERY 8 HOURS
Refills: 0 | Status: DISCONTINUED | OUTPATIENT
Start: 2024-03-24 | End: 2024-03-26

## 2024-03-24 RX ORDER — SILODOSIN 4 MG/1
1 CAPSULE ORAL
Qty: 0 | Refills: 0 | DISCHARGE

## 2024-03-24 RX ORDER — ACETAMINOPHEN 500 MG
1000 TABLET ORAL EVERY 6 HOURS
Refills: 0 | Status: COMPLETED | OUTPATIENT
Start: 2024-03-24 | End: 2024-03-25

## 2024-03-24 RX ORDER — SODIUM CHLORIDE 9 MG/ML
1000 INJECTION INTRAMUSCULAR; INTRAVENOUS; SUBCUTANEOUS ONCE
Refills: 0 | Status: COMPLETED | OUTPATIENT
Start: 2024-03-24 | End: 2024-03-24

## 2024-03-24 RX ORDER — CABERGOLINE 0.5 MG/1
0.5 TABLET ORAL
Refills: 0 | DISCHARGE

## 2024-03-24 RX ORDER — ATORVASTATIN CALCIUM 80 MG/1
1 TABLET, FILM COATED ORAL
Qty: 0 | Refills: 0 | DISCHARGE

## 2024-03-24 RX ORDER — FAMOTIDINE 10 MG/ML
20 INJECTION INTRAVENOUS ONCE
Refills: 0 | Status: COMPLETED | OUTPATIENT
Start: 2024-03-24 | End: 2024-03-24

## 2024-03-24 RX ADMIN — Medication 1000 MILLIGRAM(S): at 12:11

## 2024-03-24 RX ADMIN — Medication 1000 MILLIGRAM(S): at 18:49

## 2024-03-24 RX ADMIN — FAMOTIDINE 20 MILLIGRAM(S): 10 INJECTION INTRAVENOUS at 07:48

## 2024-03-24 RX ADMIN — SODIUM CHLORIDE 100 MILLILITER(S): 9 INJECTION, SOLUTION INTRAVENOUS at 10:54

## 2024-03-24 RX ADMIN — Medication 400 MILLIGRAM(S): at 18:34

## 2024-03-24 RX ADMIN — Medication 400 MILLIGRAM(S): at 11:56

## 2024-03-24 RX ADMIN — Medication 400 MILLIGRAM(S): at 23:05

## 2024-03-24 RX ADMIN — HEPARIN SODIUM 5000 UNIT(S): 5000 INJECTION INTRAVENOUS; SUBCUTANEOUS at 13:36

## 2024-03-24 RX ADMIN — Medication 60 MICROGRAM(S): at 21:29

## 2024-03-24 RX ADMIN — SODIUM CHLORIDE 100 MILLILITER(S): 9 INJECTION, SOLUTION INTRAVENOUS at 11:57

## 2024-03-24 RX ADMIN — SODIUM CHLORIDE 1000 MILLILITER(S): 9 INJECTION INTRAMUSCULAR; INTRAVENOUS; SUBCUTANEOUS at 07:48

## 2024-03-24 RX ADMIN — Medication 15 MILLIGRAM(S): at 07:48

## 2024-03-24 RX ADMIN — HEPARIN SODIUM 5000 UNIT(S): 5000 INJECTION INTRAVENOUS; SUBCUTANEOUS at 21:29

## 2024-03-24 NOTE — ED PROVIDER NOTE - PHYSICAL EXAMINATION
General: Appears well and nontoxic  Mentation: AAO x 3  psych: mood appropriate  HEENT: airway patent, conjunctivae clear bilaterally  Resp: symmetric chest rise, no resp distress, breath sounds CTA bilaterally  Cardio: RRR, no m/r/g  GI: soft/nondistended, Lower abdominal pain, periumbilical ecchymosis  Neuro: sensation and motor function grossly intact  Skin: no cyanosis, no jaundice  MSK: normal movement of all extremities

## 2024-03-24 NOTE — PATIENT PROFILE ADULT - VISION (WITH CORRECTIVE LENSES IF THE PATIENT USUALLY WEARS THEM):
1 pair prescribed eyeglasses/Normal vision: sees adequately in most situations; can see medication labels, newsprint

## 2024-03-24 NOTE — ED ADULT NURSE NOTE - NSFALLUNIVINTERV_ED_ALL_ED
Bed/Stretcher in lowest position, wheels locked, appropriate side rails in place/Call bell, personal items and telephone in reach/Instruct patient to call for assistance before getting out of bed/chair/stretcher/Non-slip footwear applied when patient is off stretcher/[place] to call system/Physically safe environment - no spills, clutter or unnecessary equipment/Purposeful proactive rounding/Room/bathroom lighting operational, light cord in reach

## 2024-03-24 NOTE — H&P ADULT - ATTENDING COMMENTS
POD 6 from lap appendectomy presents w abdominal pain  Abd softly distended  WBC 11  CT reviewed with radiology - consistent with an ileus, no transition point, no tethering of small bowel to fascial closure  NPO, IV hydration

## 2024-03-24 NOTE — H&P ADULT - ASSESSMENT
73M PMHx HTN, HLD, GERD, CAD w/ stents (2016/2017 on ASA), and recurrent inguinal hernia repair presents 1 wk post op from lap appy and umbilical hernia repair with Dr. Mendoza after having sudden severe frank-umbilical pain yesterday. On exam abd softly distended, some moderate frank-incisional pain and ecchymosis. labs with leukocytosis 11. CTAP consistent with post-op ileus.    Plan:  - admit to Dr. Mendoza  - NPO/IVF  - pain control prn  - med rec complete  - Strict I/Os  - q4 vitals  - monitor bowel function, currently passing gas and having BM      Discussed with Dr. eMndoza  Surgery Team A  [id number]

## 2024-03-24 NOTE — H&P ADULT - NSHPLABSRESULTS_GEN_ALL_CORE
LABS:                        14.5   11.00 )-----------( 221      ( 24 Mar 2024 07:35 )             44.2     03-24    138  |  101  |  17  ----------------------------<  92  4.8   |  25  |  1.00    Ca    9.7      24 Mar 2024 07:35    TPro  7.2  /  Alb  3.8  /  TBili  0.5  /  DBili  x   /  AST  46<H>  /  ALT  34  /  AlkPhos  64  03-24    Lactate:    PT/INR - ( 24 Mar 2024 07:35 )   PT: 12.6 sec;   INR: 1.13 ratio         PTT - ( 24 Mar 2024 07:35 )  PTT:31.8 sec          Urinalysis Basic - ( 24 Mar 2024 07:35 )    Color: x / Appearance: x / SG: x / pH: x  Gluc: 92 mg/dL / Ketone: x  / Bili: x / Urobili: x   Blood: x / Protein: x / Nitrite: x   Leuk Esterase: x / RBC: x / WBC x   Sq Epi: x / Non Sq Epi: x / Bacteria: x        IMAGING:

## 2024-03-24 NOTE — ED ADULT NURSE REASSESSMENT NOTE - NS ED NURSE REASSESS COMMENT FT1
Patient is A/O x 4, NAD, RR even and unlabored, abdomen (+) ecchymosis, and distended periumbilical area with healing wounds s/p surgery, patient states pain has decrease to 5/10 which is a tolerable level of pain at this time, pending CT scan, plan of care reviewed, safety maintained, will continue to monitor patient

## 2024-03-24 NOTE — ED PROVIDER NOTE - ATTENDING CONTRIBUTION TO CARE
HPI: 73-year-old male with a past medical history of hypertension, hyperlipidemia, hypothyroidism, BPH, CAD, appendectomy on March 17 presenting with periumbilical abdominal pain. Patient developed abdominal pain approximately 12 hours ago. Located in the periumbilical region, nonradiating. Patient had a bowel movement 1 hour prior to presentation that was nonbloody. Associated belching. Denies chest pain, difficulty breathing, vomiting. yday pt was tolerating PO, ate a lot of bread and sushi and had normal BM 1 hour prior to arrival. No blood seen in stool per pt, no fever, chills, nausea, diarrhea.     EXAM: abd tenderness to palpation diffusely with ecchymosis from recent surgery with induration around periumbilical region, no pus drainage, no erythema, no fluctuance, no rebound/guarding otherwise, no CVAT, heart RRR, lungs ctab.     MDM: pt with HTN, HLD, and hypothyroidism, recent appendectomy here at [facility] that returns for 12 hours of periumbilical pain and belching but passing gas with normal BM 1 hour prior to arrival, no blood. Likely GERD vs peristalsis pain, but due to recent surgery will require labs, CT imaging, pain control and likely consult surgery.

## 2024-03-24 NOTE — ED PROVIDER NOTE - OBJECTIVE STATEMENT
73-year-old male with a past medical history of hypertension, hyperlipidemia, hypothyroidism, BPH, CAD, appendectomy on March 17 presenting with periumbilical abdominal pain. Patient developed abdominal pain approximately 12 hours ago. Located in the periumbilical region, nonradiating. Patient had a bowel movement 1 hour prior to presentation that was nonbloody. Associated belching. Denies chest pain, difficulty breathing, vomiting.

## 2024-03-24 NOTE — ED ADULT NURSE NOTE - OBJECTIVE STATEMENT
Pt received on stretcher c/o periumbilical pain that started last night, abdomen is distended, soft, tender. Pt received on stretcher c/o periumbilical pain that started last night, abdomen is distended, soft, non tender.

## 2024-03-24 NOTE — H&P ADULT - NSHPPHYSICALEXAM_GEN_ALL_CORE
VITAL SIGNS:  Vital Signs Last 24 Hrs  T(C): 36.9 (24 Mar 2024 07:05), Max: 36.9 (24 Mar 2024 07:05)  T(F): 98.5 (24 Mar 2024 07:05), Max: 98.5 (24 Mar 2024 07:05)  HR: 82 (24 Mar 2024 07:05) (82 - 82)  BP: 150/79 (24 Mar 2024 07:05) (150/79 - 150/79)  BP(mean): --  RR: 18 (24 Mar 2024 07:05) (18 - 18)  SpO2: 100% (24 Mar 2024 07:05) (100% - 100%)    Parameters below as of 24 Mar 2024 07:05  Patient On (Oxygen Delivery Method): room air          PHYSICAL EXAM:    General: NAD, Sitting in bed comfortably  HEENT: NC/AT, EOMI  Neck: Soft, supple  Cardio: RRR, nml S1/S2  Resp: Good effort, CTA b/l  GI/Abd: Soft, moderately distended, TTP around umbilical incision, no rebound/guarding, no masses palpated, previous incisions healing well, mild ecchymosis around inferior abd  Vascular: Extremities warm, brisk cap refill, B/l radial pulses palpable, b/l DP/PT palpable, no palpable abdominal pulsatile mass  Skin: Intact, no breakdown  Lymphatic/Nodes: No palpable lymphadenopathy  Musculoskeletal: All 4 extremities moving spontaneously, no limitations

## 2024-03-24 NOTE — H&P ADULT - HISTORY OF PRESENT ILLNESS
73M PMHx HTN, HLD, GERD, CAD w/ stents (2016/2017 on ASA), pituitary adenoma, and recurrent inguinal hernia repair presents 1 wk post op from lap appy and umbilical hernia repair with Dr. Mendoza after having sudden severe frank-umbilical pain yesterday. Reports he was recovering well and the only change from normal was that yesterday he ate a large amount of food for the first time in one sitting. .denies     In the ED patient HDS, labs , CTAP with IV contrast revealed dilated SB loops most consistent with post-op ileus.

## 2024-03-24 NOTE — ED ADULT TRIAGE NOTE - CHIEF COMPLAINT QUOTE
s/p appendectomy 3/17- p/w periumbilical pain x 12 hr- last BM this AM- hx HTN HLD hypothyroid cardiac stents x 3

## 2024-03-24 NOTE — ED PROVIDER NOTE - CLINICAL SUMMARY MEDICAL DECISION MAKING FREE TEXT BOX
73-year-old male with a past medical history of hypertension, hyperlipidemia, hypothyroidism, BPH, CAD, appendectomy on March 17 presenting with periumbilical abdominal pain. Patient developed abdominal pain approximately 12 hours ago. Located in the periumbilical region, nonradiating. Patient had a bowel movement 1 hour prior to presentation that was nonbloody. Associated belching. Denies chest pain, difficulty breathing, vomiting. Physical exam reveals lower abdominal tenderness, periumbilical ecchymosis, surgical scars are well-healed. Labs, CT scan, pain control.

## 2024-03-25 ENCOUNTER — TRANSCRIPTION ENCOUNTER (OUTPATIENT)
Age: 74
End: 2024-03-25

## 2024-03-25 LAB
ANION GAP SERPL CALC-SCNC: 6 MMOL/L — LOW (ref 7–14)
BUN SERPL-MCNC: 13 MG/DL — SIGNIFICANT CHANGE UP (ref 7–23)
CALCIUM SERPL-MCNC: 8.4 MG/DL — SIGNIFICANT CHANGE UP (ref 8.4–10.5)
CHLORIDE SERPL-SCNC: 108 MMOL/L — HIGH (ref 98–107)
CO2 SERPL-SCNC: 27 MMOL/L — SIGNIFICANT CHANGE UP (ref 22–31)
CREAT SERPL-MCNC: 1 MG/DL — SIGNIFICANT CHANGE UP (ref 0.5–1.3)
EGFR: 78 ML/MIN/1.73M2 — SIGNIFICANT CHANGE UP
GLUCOSE SERPL-MCNC: 72 MG/DL — SIGNIFICANT CHANGE UP (ref 70–99)
HCT VFR BLD CALC: 39.4 % — SIGNIFICANT CHANGE UP (ref 39–50)
HCV AB S/CO SERPL IA: 0.1 S/CO — SIGNIFICANT CHANGE UP (ref 0–0.99)
HCV AB SERPL-IMP: SIGNIFICANT CHANGE UP
HGB BLD-MCNC: 13 G/DL — SIGNIFICANT CHANGE UP (ref 13–17)
MAGNESIUM SERPL-MCNC: 2.2 MG/DL — SIGNIFICANT CHANGE UP (ref 1.6–2.6)
MCHC RBC-ENTMCNC: 28.3 PG — SIGNIFICANT CHANGE UP (ref 27–34)
MCHC RBC-ENTMCNC: 33 GM/DL — SIGNIFICANT CHANGE UP (ref 32–36)
MCV RBC AUTO: 85.7 FL — SIGNIFICANT CHANGE UP (ref 80–100)
NRBC # BLD: 0 /100 WBCS — SIGNIFICANT CHANGE UP (ref 0–0)
NRBC # FLD: 0 K/UL — SIGNIFICANT CHANGE UP (ref 0–0)
PHOSPHATE SERPL-MCNC: 2.6 MG/DL — SIGNIFICANT CHANGE UP (ref 2.5–4.5)
PLATELET # BLD AUTO: 198 K/UL — SIGNIFICANT CHANGE UP (ref 150–400)
POTASSIUM SERPL-MCNC: 4 MMOL/L — SIGNIFICANT CHANGE UP (ref 3.5–5.3)
POTASSIUM SERPL-SCNC: 4 MMOL/L — SIGNIFICANT CHANGE UP (ref 3.5–5.3)
RBC # BLD: 4.6 M/UL — SIGNIFICANT CHANGE UP (ref 4.2–5.8)
RBC # FLD: 13.5 % — SIGNIFICANT CHANGE UP (ref 10.3–14.5)
SODIUM SERPL-SCNC: 141 MMOL/L — SIGNIFICANT CHANGE UP (ref 135–145)
SURGICAL PATHOLOGY STUDY: SIGNIFICANT CHANGE UP
WBC # BLD: 5.97 K/UL — SIGNIFICANT CHANGE UP (ref 3.8–10.5)
WBC # FLD AUTO: 5.97 K/UL — SIGNIFICANT CHANGE UP (ref 3.8–10.5)

## 2024-03-25 RX ADMIN — HEPARIN SODIUM 5000 UNIT(S): 5000 INJECTION INTRAVENOUS; SUBCUTANEOUS at 21:33

## 2024-03-25 RX ADMIN — Medication 400 MILLIGRAM(S): at 05:10

## 2024-03-25 RX ADMIN — HEPARIN SODIUM 5000 UNIT(S): 5000 INJECTION INTRAVENOUS; SUBCUTANEOUS at 05:10

## 2024-03-25 NOTE — DISCHARGE NOTE PROVIDER - HOSPITAL COURSE
73M PMHx HTN, HLD, GERD, CAD w/ stents (2016/2017 on ASA), and recurrent inguinal hernia repair presents 1 wk post op from lap appy and umbilical hernia repair with Dr. Mendoza, CTAP performed in the ED consistent with post-op ileus. Patient was admitted to the Surgery team on 3/24/24. Patient was managed conservatively. Their diet was then slowly advanced as tolerated. ** Pending ** Once patient was tolerating regular diet, voiding and ambulating without difficulty, they were found to be stable for discharge to home. 73M PMHx HTN, HLD, GERD, CAD w/ stents (2016/2017 on ASA), and recurrent inguinal hernia repair presents 1 wk post op from lap appy and umbilical hernia repair with Dr. Mendoza, CTAP performed in the ED consistent with post-op ileus. Patient was admitted to the Surgery team on 3/24/24. Patient was managed conservatively. Their diet was then slowly advanced as tolerated. Once patient was tolerating regular diet, voiding and ambulating without difficulty, they were found to be stable for discharge to home.

## 2024-03-25 NOTE — DISCHARGE NOTE PROVIDER - PROVIDER RX CONTACT NUMBER
[phone] Patient is a 88y Male whom presented to the hospital with     PAST MEDICAL & SURGICAL HISTORY:  Bladder cancer  Obesity: BMI = 30.2  LBBB (left bundle branch block): noted on  EKG   Bladder polyps  Prostate cancer: diagnosed in ; pt received radioactive seeds  BPH (benign prostatic hyperplasia)  HTN (hypertension)  Ureteric obstruction  Prediabetes  H/O: hypertension  S/P cataract surgery, left  Bladder polyps: removed  S/P cystoscopy: multiple cystoscopies over 10 years with removal of  bladder polyps  S/P tonsillectomy: as a child  Chest wall symptom: s/p reomval of shrapnel from right chest wall in 195      MEDICATIONS  (STANDING):  aspirin  chewable 81 milliGRAM(s) Oral daily  atorvastatin 10 milliGRAM(s) Oral at bedtime  buDESOnide   0.5 milliGRAM(s) Respule 0.5 milliGRAM(s) Inhalation two times a day  dextrose 5%. 1000 milliLiter(s) (50 mL/Hr) IV Continuous <Continuous>  dextrose 50% Injectable 12.5 Gram(s) IV Push once  dextrose 50% Injectable 25 Gram(s) IV Push once  dextrose 50% Injectable 25 Gram(s) IV Push once  doxazosin 4 milliGRAM(s) Oral at bedtime  guaiFENesin ER 1200 milliGRAM(s) Oral every 12 hours  heparin  Injectable 5000 Unit(s) SubCutaneous every 12 hours  insulin lispro (HumaLOG) corrective regimen sliding scale   SubCutaneous three times a day before meals  insulin lispro (HumaLOG) corrective regimen sliding scale   SubCutaneous at bedtime  lactobacillus acidophilus 1 Tablet(s) Oral every 8 hours  memantine 5 milliGRAM(s) Oral two times a day  metoprolol tartrate 25 milliGRAM(s) Oral two times a day  multivitamin 1 Tablet(s) Oral daily  pantoprazole    Tablet 40 milliGRAM(s) Oral before breakfast  piperacillin/tazobactam IVPB. 3.375 Gram(s) IV Intermittent every 12 hours  sodium chloride 0.9%. 1000 milliLiter(s) (50 mL/Hr) IV Continuous <Continuous>  venlafaxine 37.5 milliGRAM(s) Oral daily      Allergies    No Known Drug Allergies  seasonal allergies (Other)    Intolerances        SOCIAL HISTORY:  Denies ETOh,Smoking,     FAMILY HISTORY:      REVIEW OF SYSTEMS:    CONSTITUTIONAL: No weakness, fevers or chills  EYES/ENT: No visual changes;  no throat pain   NECK: No pain or stiffness  RESPIRATORY: No cough, wheezing, hemoptysis; No shortness of breath  CARDIOVASCULAR: No chest pain or palpitations  GASTROINTESTINAL: No abdominal or epigastric pain. No nausea, vomiting,     No diarrhea or constipation. No melena   GENITOURINARY: No dysuria, frequency or hematuria  NEUROLOGICAL: No numbness or weakness  SKIN: dry      VITAL:  T(C): , Max: 37.3 (19 @ 20:24)  T(F): , Max: 99.2 (19 @ 20:24)  HR: 111 (19 @ 16:45)  BP: 95/62 (19 @ 16:45)  BP(mean): --  RR: 21 (19 @ 16:45)  SpO2: 96% (19 @ 16:45)  Wt(kg): --    I and O's:     @ 07:01  -   @ 07:00  --------------------------------------------------------  IN: 0 mL / OUT: 400 mL / NET: -400 mL      Height (cm): 175.26 ( 20:24)  Weight (kg): 70.3 ( 20:24)  BMI (kg/m2): 22.9 ( 20:24)  BSA (m2): 1.85 ( 20:24)    PHYSICAL EXAM:    Constitutional: NAD  HEENT: conjunctive   clear   Neck:  No JVD  Respiratory: CTAB  Cardiovascular: S1 and S2  Gastrointestinal: BS+, soft, NT/ND  Extremities: No peripheral edema  Neurological: A/O x 3, no focal deficits  Psychiatric: Normal mood, normal affect  : No Howard  Skin: No rashes  Access: Not applicable    LABS:                        9.7    x     )-----------( x        ( 29 May 2019 12:32 )             28.7     05-    133<L>  |  100  |  38<H>  ----------------------------<  122<H>  4.1   |  25  |  1.85<H>    Ca    8.5      29 May 2019 06:47    TPro  6.3  /  Alb  2.8<L>  /  TBili  1.2  /  DBili  x   /  AST  29  /  ALT  17  /  AlkPhos  63  05-      Urine Studies:  Urinalysis Basic - ( 29 May 2019 02:45 )    Color: Yellow / Appearance: Clear / S.020 / pH: x  Gluc: x / Ketone: Negative  / Bili: Negative / Urobili: Negative mg/dL   Blood: x / Protein: 30 mg/dL / Nitrite: Negative   Leuk Esterase: Negative / RBC: 0-2 /HPF / WBC Negative   Sq Epi: x / Non Sq Epi: Occasional / Bacteria: Few            RADIOLOGY & ADDITIONAL STUDIES: Patient is a 88y Male whom presented to the hospital with ckd and gene     PAST MEDICAL & SURGICAL HISTORY:  Bladder cancer  Obesity: BMI = 30.2  LBBB (left bundle branch block): noted on  EKG   Bladder polyps  Prostate cancer: diagnosed in ; pt received radioactive seeds  BPH (benign prostatic hyperplasia)  HTN (hypertension)  Ureteric obstruction  Prediabetes  H/O: hypertension  S/P cataract surgery, left  Bladder polyps: removed  S/P cystoscopy: multiple cystoscopies over 10 years with removal of  bladder polyps  S/P tonsillectomy: as a child  Chest wall symptom: s/p reomval of shrapnel from right chest wall in       MEDICATIONS  (STANDING):  aspirin  chewable 81 milliGRAM(s) Oral daily  atorvastatin 10 milliGRAM(s) Oral at bedtime  buDESOnide   0.5 milliGRAM(s) Respule 0.5 milliGRAM(s) Inhalation two times a day  dextrose 5%. 1000 milliLiter(s) (50 mL/Hr) IV Continuous <Continuous>  dextrose 50% Injectable 12.5 Gram(s) IV Push once  dextrose 50% Injectable 25 Gram(s) IV Push once  dextrose 50% Injectable 25 Gram(s) IV Push once  doxazosin 4 milliGRAM(s) Oral at bedtime  guaiFENesin ER 1200 milliGRAM(s) Oral every 12 hours  heparin  Injectable 5000 Unit(s) SubCutaneous every 12 hours  insulin lispro (HumaLOG) corrective regimen sliding scale   SubCutaneous three times a day before meals  insulin lispro (HumaLOG) corrective regimen sliding scale   SubCutaneous at bedtime  lactobacillus acidophilus 1 Tablet(s) Oral every 8 hours  memantine 5 milliGRAM(s) Oral two times a day  metoprolol tartrate 25 milliGRAM(s) Oral two times a day  multivitamin 1 Tablet(s) Oral daily  pantoprazole    Tablet 40 milliGRAM(s) Oral before breakfast  piperacillin/tazobactam IVPB. 3.375 Gram(s) IV Intermittent every 12 hours  sodium chloride 0.9%. 1000 milliLiter(s) (50 mL/Hr) IV Continuous <Continuous>  venlafaxine 37.5 milliGRAM(s) Oral daily      Allergies    No Known Drug Allergies  seasonal allergies (Other)    Intolerances        SOCIAL HISTORY:  Denies ETOh,Smoking,     FAMILY HISTORY:      REVIEW OF SYSTEMS:    CONSTITUTIONAL: pos  weakness, no fevers or chills  EYES/ENT: No visual changes;  no throat pain   NECK: No pain or stiffness  RESPIRATORY: pos  cough, wheezing, hemoptysis; pos  shortness of breath  CARDIOVASCULAR: pos  chest pain or no palpitations  GASTROINTESTINAL: No abdominal or epigastric pain. No nausea, vomiting,     No diarrhea or constipation. No melena   GENITOURINARY: No dysuria, frequency or hematuria  NEUROLOGICAL: No numbness or weakness  SKIN: dry      VITAL:  T(C): , Max: 37.3 (19 @ 20:24)  T(F): , Max: 99.2 (19 @ 20:24)  HR: 111 (19 @ 16:45)  BP: 95/62 (19 16:45)  BP(mean): --  RR: 21 (19 @ 16:45)  SpO2: 96% (19 @ 16:45)  Wt(kg): --    I and O's:     @ 07:01  -   @ 07:00  --------------------------------------------------------  IN: 0 mL / OUT: 400 mL / NET: -400 mL      Height (cm): 175.26 ( 20:24)  Weight (kg): 70.3 ( 20:24)  BMI (kg/m2): 22.9 ( 20:24)  BSA (m2): 1.85 ( 20:24)    PHYSICAL EXAM:    Constitutional: NAD  HEENT: conjunctive   clear   Neck:  No JVD  Respiratory: decrease bs b/l   Cardiovascular: S1 and S2  Gastrointestinal: BS+, soft, NT/ND  Extremities: No peripheral edema  Neurological:  no focal deficits  Psychiatric: Normal mood, normal affect  : No Howard  Skin: dry   Access: Not applicable    LABS:                        9.7    x     )-----------( x        ( 29 May 2019 12:32 )             28.7     05-    133<L>  |  100  |  38<H>  ----------------------------<  122<H>  4.1   |  25  |  1.85<H>    Ca    8.5      29 May 2019 06:47    TPro  6.3  /  Alb  2.8<L>  /  TBili  1.2  /  DBili  x   /  AST  29  /  ALT  17  /  AlkPhos  63  05-      Urine Studies:  Urinalysis Basic - ( 29 May 2019 02:45 )    Color: Yellow / Appearance: Clear / S.020 / pH: x  Gluc: x / Ketone: Negative  / Bili: Negative / Urobili: Negative mg/dL   Blood: x / Protein: 30 mg/dL / Nitrite: Negative   Leuk Esterase: Negative / RBC: 0-2 /HPF / WBC Negative   Sq Epi: x / Non Sq Epi: Occasional / Bacteria: Few            RADIOLOGY & ADDITIONAL STUDIES:

## 2024-03-25 NOTE — DISCHARGE NOTE PROVIDER - NSDCCPCAREPLAN_GEN_ALL_CORE_FT
PRINCIPAL DISCHARGE DIAGNOSIS  Diagnosis: Postoperative ileus  Assessment and Plan of Treatment: WOUND CARE:  Please keep incisions clean and dry. Please do not Scrub or rub incisions. Do not use lotion or powder on incisions.   BATHING: You may shower and/or sponge bathe. You may use warm soapy water in the shower and rinse, pat dry.  ACTIVITY: No heavy lifting or straining. Otherwise, you may return to your usual level of physical activity. If you are taking narcotic pain medication DO NOT drive a car, operate machinery or make important decisions.  DIET: Return to your usual diet.  NOTIFY YOUR SURGEON IF YOU HAVE: any bleeding that does not stop, any pus draining from your wound(s), any fever (over 100.4 F) persistent nausea/vomiting, or if your pain is not controlled on your discharge pain medications, unable to urinate.  Please follow up with your primary care physician in one week regarding your hospitalization, bring copies of your discharge paperwork.  Please follow up with your surgeon, Dr. Mendoza within 2 weeks of discharge. Please call to schedule an appointment.     PRINCIPAL DISCHARGE DIAGNOSIS  Diagnosis: Postoperative ileus  Assessment and Plan of Treatment: DIET: Return to your usual diet.  NOTIFY YOUR SURGEON IF YOU HAVE: any bleeding that does not stop, any pus draining from your wound(s), any fever (over 100.4 F) persistent nausea/vomiting, or if your pain is not controlled on your discharge pain medications, unable to urinate.  Please follow up with your primary care physician in one week regarding your hospitalization, bring copies of your discharge paperwork.  Please follow up with your surgeon, Dr. Mendoza within 2 weeks of discharge. Please call to schedule an appointment.

## 2024-03-25 NOTE — DISCHARGE NOTE PROVIDER - NSDCMRMEDTOKEN_GEN_ALL_CORE_FT
acetaminophen 325 mg oral tablet: 3 tab(s) orally every 6 hours  amLODIPine 5 mg oral tablet: 1 tab(s) orally once a day  atorvastatin 40 mg oral tablet: 1 tab(s) orally once a day  cabergoline 0.5 mg oral tablet: 0.5 tab(s) orally 2 times a week  levothyroxine 75 mcg (0.075 mg) oral tablet: 1 tab(s) orally once a day  losartan 100 mg oral tablet: 1 tab(s) orally once a day  Multiple Vitamins oral capsule: 1 cap(s) orally once a day  Rapaflo 8 mg oral capsule: 1 cap(s) orally once a day

## 2024-03-25 NOTE — DISCHARGE NOTE PROVIDER - CARE PROVIDER_API CALL
Emerald Mendoza  Surgery  [address] [place] [address]  Phone: [phone]  Fax: [phone]  Follow Up Time: 2 weeks

## 2024-03-25 NOTE — PROGRESS NOTE ADULT - ATTENDING COMMENTS
DATE OF SERVICE: 03-25-24 @ 11:15    +BM/F, feels less distended today  Abd soft, mildly distended, non tender, +ecchymosis at umbilicus     CLD today  Post op ileus improving  Drop IVF  OOB and ambulate

## 2024-03-26 ENCOUNTER — TRANSCRIPTION ENCOUNTER (OUTPATIENT)
Age: 74
End: 2024-03-26

## 2024-03-26 VITALS
TEMPERATURE: 98 F | DIASTOLIC BLOOD PRESSURE: 85 MMHG | HEART RATE: 80 BPM | OXYGEN SATURATION: 100 % | RESPIRATION RATE: 18 BRPM | SYSTOLIC BLOOD PRESSURE: 146 MMHG

## 2024-03-26 LAB
ANION GAP SERPL CALC-SCNC: 9 MMOL/L — SIGNIFICANT CHANGE UP (ref 7–14)
BUN SERPL-MCNC: 8 MG/DL — SIGNIFICANT CHANGE UP (ref 7–23)
CALCIUM SERPL-MCNC: 8.6 MG/DL — SIGNIFICANT CHANGE UP (ref 8.4–10.5)
CHLORIDE SERPL-SCNC: 110 MMOL/L — HIGH (ref 98–107)
CO2 SERPL-SCNC: 24 MMOL/L — SIGNIFICANT CHANGE UP (ref 22–31)
CREAT SERPL-MCNC: 0.87 MG/DL — SIGNIFICANT CHANGE UP (ref 0.5–1.3)
EGFR: 90 ML/MIN/1.73M2 — SIGNIFICANT CHANGE UP
GLUCOSE SERPL-MCNC: 75 MG/DL — SIGNIFICANT CHANGE UP (ref 70–99)
HCT VFR BLD CALC: 38.2 % — LOW (ref 39–50)
HGB BLD-MCNC: 12.8 G/DL — LOW (ref 13–17)
MAGNESIUM SERPL-MCNC: 2.1 MG/DL — SIGNIFICANT CHANGE UP (ref 1.6–2.6)
MCHC RBC-ENTMCNC: 28.3 PG — SIGNIFICANT CHANGE UP (ref 27–34)
MCHC RBC-ENTMCNC: 33.5 GM/DL — SIGNIFICANT CHANGE UP (ref 32–36)
MCV RBC AUTO: 84.3 FL — SIGNIFICANT CHANGE UP (ref 80–100)
NRBC # BLD: 0 /100 WBCS — SIGNIFICANT CHANGE UP (ref 0–0)
NRBC # FLD: 0 K/UL — SIGNIFICANT CHANGE UP (ref 0–0)
PHOSPHATE SERPL-MCNC: 2.5 MG/DL — SIGNIFICANT CHANGE UP (ref 2.5–4.5)
PLATELET # BLD AUTO: 192 K/UL — SIGNIFICANT CHANGE UP (ref 150–400)
POTASSIUM SERPL-MCNC: 4 MMOL/L — SIGNIFICANT CHANGE UP (ref 3.5–5.3)
POTASSIUM SERPL-SCNC: 4 MMOL/L — SIGNIFICANT CHANGE UP (ref 3.5–5.3)
RBC # BLD: 4.53 M/UL — SIGNIFICANT CHANGE UP (ref 4.2–5.8)
RBC # FLD: 13.4 % — SIGNIFICANT CHANGE UP (ref 10.3–14.5)
SODIUM SERPL-SCNC: 143 MMOL/L — SIGNIFICANT CHANGE UP (ref 135–145)
WBC # BLD: 5.31 K/UL — SIGNIFICANT CHANGE UP (ref 3.8–10.5)
WBC # FLD AUTO: 5.31 K/UL — SIGNIFICANT CHANGE UP (ref 3.8–10.5)

## 2024-03-26 RX ORDER — ATORVASTATIN CALCIUM 80 MG/1
40 TABLET, FILM COATED ORAL AT BEDTIME
Refills: 0 | Status: DISCONTINUED | OUTPATIENT
Start: 2024-03-26 | End: 2024-03-26

## 2024-03-26 RX ORDER — TAMSULOSIN HYDROCHLORIDE 0.4 MG/1
0.4 CAPSULE ORAL AT BEDTIME
Refills: 0 | Status: DISCONTINUED | OUTPATIENT
Start: 2024-03-26 | End: 2024-03-26

## 2024-03-26 RX ORDER — LEVOTHYROXINE SODIUM 125 MCG
75 TABLET ORAL DAILY
Refills: 0 | Status: DISCONTINUED | OUTPATIENT
Start: 2024-03-26 | End: 2024-03-26

## 2024-03-26 RX ORDER — LOSARTAN POTASSIUM 100 MG/1
100 TABLET, FILM COATED ORAL DAILY
Refills: 0 | Status: DISCONTINUED | OUTPATIENT
Start: 2024-03-26 | End: 2024-03-26

## 2024-03-26 RX ORDER — AMLODIPINE BESYLATE 2.5 MG/1
5 TABLET ORAL DAILY
Refills: 0 | Status: DISCONTINUED | OUTPATIENT
Start: 2024-03-26 | End: 2024-03-26

## 2024-03-26 RX ORDER — MULTIVIT-MIN/FERROUS GLUCONATE 9 MG/15 ML
1 LIQUID (ML) ORAL DAILY
Refills: 0 | Status: DISCONTINUED | OUTPATIENT
Start: 2024-03-26 | End: 2024-03-26

## 2024-03-26 RX ADMIN — HEPARIN SODIUM 5000 UNIT(S): 5000 INJECTION INTRAVENOUS; SUBCUTANEOUS at 05:34

## 2024-03-26 RX ADMIN — LOSARTAN POTASSIUM 100 MILLIGRAM(S): 100 TABLET, FILM COATED ORAL at 11:50

## 2024-03-26 RX ADMIN — AMLODIPINE BESYLATE 5 MILLIGRAM(S): 2.5 TABLET ORAL at 11:50

## 2024-03-26 RX ADMIN — Medication 60 MICROGRAM(S): at 05:34

## 2024-03-26 NOTE — PROGRESS NOTE ADULT - NS ATTEND AMEND GEN_ALL_CORE FT
DATE OF SERVICE: 03-26-24 @ 12:08    + bowel fx, feeling much better, tolerating reg diet  VSS  Labs WNL  abd soft less distended, nontender    DC home today  FU 1 wk in office

## 2024-03-26 NOTE — PROGRESS NOTE ADULT - SUBJECTIVE AND OBJECTIVE BOX
TEAM [ A ] Surgery Daily Progress Note  =====================================================    SUBJECTIVE: Patient seen and examined at bedside on AM rounds. Patient reports that they're feeling well. Tolerating diet, denies nausea, vomiting. +   Flatus/  +  BM. OOB/Amublating as tolerated. Denies fever, chills.      ALLERGIES:  No Known Allergies      --------------------------------------------------------------------------------------    MEDICATIONS:    Hematologic/Oncologic Medications  heparin   Injectable 5000 Unit(s) SubCutaneous every 8 hours    Endocrine/Metabolic Medications  levothyroxine Injectable 60 MICROGram(s) IV Push <User Schedule>  --------------------------------------------------------------------------------------    VITAL SIGNS:  T(C): 36.8 (03-26-24 @ 06:00), Max: 36.8 (03-25-24 @ 10:00)  HR: 61 (03-26-24 @ 06:00) (61 - 68)  BP: 149/90 (03-26-24 @ 06:00) (124/75 - 149/90)  RR: 18 (03-26-24 @ 06:00) (18 - 18)  SpO2: 98% (03-26-24 @ 06:00) (98% - 100%)  --------------------------------------------------------------------------------------    EXAM    General: NAD, resting in bed comfortably.  Cardiac: regular rate, warm and well perfused  Respiratory: Nonlabored respirations, normal cw expansion.  Abdomen: soft, nontender, improved distension.  Extremities: normal strength, FROM, no deformities      --------------------------------------------------------------------------------------    INS AND OUTS:    03-25-24 @ 07:01  -  03-26-24 @ 07:00  --------------------------------------------------------  IN: 1310 mL / OUT: 0 mL / NET: 1310 mL      --------------------------------------------------------------------------------------
SURGERY DAILY PROGRESS NOTE:     SUBJECTIVE/ROS:     Overnight: no acute events Patient seen and evaluated on AM rounds. Denies nausea, vomiting. States pain is controlled. Endorsing passing gas and had a small bowel movement overnight.        OBJECTIVE:  Vital Signs Last 24 Hrs  T(C): 36.3 (25 Mar 2024 05:00), Max: 36.9 (24 Mar 2024 12:05)  T(F): 97.3 (25 Mar 2024 05:00), Max: 98.5 (24 Mar 2024 12:05)  HR: 67 (25 Mar 2024 05:00) (60 - 71)  BP: 128/69 (25 Mar 2024 05:00) (128/69 - 148/83)  BP(mean): --  RR: 18 (25 Mar 2024 05:00) (15 - 18)  SpO2: 97% (25 Mar 2024 05:00) (97% - 100%)    Parameters below as of 25 Mar 2024 05:00  Patient On (Oxygen Delivery Method): room air      I&O's Detail    24 Mar 2024 07:01  -  25 Mar 2024 07:00  --------------------------------------------------------  IN:    IV PiggyBack: 400 mL    Lactated Ringers: 2000 mL  Total IN: 2400 mL    OUT:    Oral Fluid: 0 mL    Voided (mL): 0 mL  Total OUT: 0 mL    Total NET: 2400 mL        Daily Height in cm: 175.26 (24 Mar 2024 12:05)    Daily   MEDICATIONS  (STANDING):  heparin   Injectable 5000 Unit(s) SubCutaneous every 8 hours  lactated ringers. 1000 milliLiter(s) (100 mL/Hr) IV Continuous <Continuous>  levothyroxine Injectable 60 MICROGram(s) IV Push <User Schedule>    MEDICATIONS  (PRN):      Labs:                        14.5   11.00 )-----------( 221      ( 24 Mar 2024 07:35 )             44.2     03-24    138  |  101  |  17  ----------------------------<  92  4.8   |  25  |  1.00    Ca    9.7      24 Mar 2024 07:35    TPro  7.2  /  Alb  3.8  /  TBili  0.5  /  DBili  x   /  AST  46<H>  /  ALT  34  /  AlkPhos  64  03-24    PT/INR - ( 24 Mar 2024 07:35 )   PT: 12.6 sec;   INR: 1.13 ratio         PTT - ( 24 Mar 2024 07:35 )  PTT:31.8 sec  Urinalysis Basic - ( 24 Mar 2024 07:35 )    Color: x / Appearance: x / SG: x / pH: x  Gluc: 92 mg/dL / Ketone: x  / Bili: x / Urobili: x   Blood: x / Protein: x / Nitrite: x   Leuk Esterase: x / RBC: x / WBC x   Sq Epi: x / Non Sq Epi: x / Bacteria: x                Physical Exam:  General: well developed, well nourished, NAD  Cardiovascular: appears well perfused   Respiratory: respirations non labored  Gastrointestinal: soft, nontender, nondistended, previous incisions healing well, mild ecchymosis around inferior abd  Extremities: FROM, warm  Neurological: A+Ox3

## 2024-03-26 NOTE — DISCHARGE NOTE NURSING/CASE MANAGEMENT/SOCIAL WORK - PATIENT PORTAL LINK FT
You can access the FollowMyHealth Patient Portal offered by [facility] by registering at the following website: [email or web address]. By joining [employer]’s FollowMyHealth portal, you will also be able to view your health information using other applications (apps) compatible with our system.

## 2024-06-18 NOTE — ED ADULT NURSE NOTE - CAS TRG GEN SKIN CONDITION
"Chief Complaint  Follow-up of the Left Hip    Subjective          History of Present Illness      Marisa Uribe is a 70 y.o. female  presents to [facility] for     Patient presents for follow-up evaluation of left anterior total hip arthroplasty 2024.  She states her incision has healed well she still has numbness around the incision site.  She states she finished physical therapy she states that she was doing things she could do at home and she graduated to home exercise program.  Patient stopped her pain medication she states she still has some trouble getting around she presents without assistive ambulatory devices.  She states she has started having some knee pain.      No Known Allergies     Social History     Socioeconomic History    Marital status:    Tobacco Use    Smoking status: Former     Current packs/day: 0.00     Average packs/day: 2.0 packs/day for 15.0 years (30.0 ttl pk-yrs)     Types: Cigarettes     Start date: 1967     Quit date: 1982     Years since quittin.4    Smokeless tobacco: Never    Tobacco comments:     no second hand smoke exposure   Vaping Use    Vaping status: Never Used   Substance and Sexual Activity    Alcohol use: Yes     Alcohol/week: 1.0 standard drink of alcohol     Types: 1 Glasses of wine per week     Comment: rare occasion    Drug use: Never    Sexual activity: Defer        REVIEW OF SYSTEMS    Constitutional: Awake alert and oriented x3, no acute distress, denies fevers, chills, weight loss  Respiratory: No respiratory distress  Vascular: Brisk cap refill, Intact distal pulses, No cyanosis, compartments soft with no signs or symptoms of compartment syndrome or DVT.   Cardiovascular: Denies chest pain, shortness of breath  Skin: Denies rashes, acute skin changes  Neurologic: Denies headache, loss of consciousness  MSK: Left hip pain      Objective   Vital Signs:   /76   Pulse 67   Ht 160 cm (62.99\")   Wt 75.3 kg " (166 lb)   SpO2 96%   BMI 29.41 kg/m²     Body mass index is 29.41 kg/m².    Physical Exam       Left hip: Well-healed incision, no erythema, no ecchymosis or swelling, no effusion or fluctuance, no signs of infection, active flexion 110, no pain with rotation, nontender calf, negative Deisi testing, patient ambulates with nonantalgic gait      Procedures    Imaging Results (Most Recent)       None             Result Review :   The following data was reviewed by: ZENOBIA Lugo on 06/18/2024:               Assessment and Plan    Diagnoses and all orders for this visit:    1. Aftercare following surgery of left anterior total hip arthroplasty 5/1/2024 (Primary)      Discussed diagnosis and treatment options with the patient she was advised she could restart therapy if she wants she states she would like to continue home exercises, she was advised to continue to work on strength and range of motion, activity and weightbearing as tolerated follow-up in 6 weeks for recheck with x-rays    Call or return if worsening symptoms.    Follow Up   Return in about 6 weeks (around 7/30/2024) for Recheck.  Patient was given instructions and counseling regarding her condition or for health maintenance advice. Please see specific information pulled into the AVS if appropriate.       EMR Dragon/Transcription disclaimer:  Part of this note may be an electronic transcription/translation of spoken language to printed text using the Dragon Dictation System   Warm

## 2024-07-15 ENCOUNTER — APPOINTMENT (OUTPATIENT)
Dept: INTERNAL MEDICINE | Facility: CLINIC | Age: 74
End: 2024-07-15
Payer: COMMERCIAL

## 2024-07-15 ENCOUNTER — NON-APPOINTMENT (OUTPATIENT)
Age: 74
End: 2024-07-15

## 2024-07-15 VITALS
WEIGHT: 192.13 LBS | DIASTOLIC BLOOD PRESSURE: 72 MMHG | SYSTOLIC BLOOD PRESSURE: 112 MMHG | HEIGHT: 69 IN | BODY MASS INDEX: 28.46 KG/M2

## 2024-07-15 DIAGNOSIS — Z13.828 ENCOUNTER FOR SCREENING FOR OTHER MUSCULOSKELETAL DISORDER: ICD-10-CM

## 2024-07-15 PROCEDURE — 99397 PER PM REEVAL EST PAT 65+ YR: CPT

## 2024-07-15 PROCEDURE — 93000 ELECTROCARDIOGRAM COMPLETE: CPT

## 2024-07-15 RX ORDER — SELENIUM SULFIDE 25 MG/ML
2.5 LOTION TOPICAL
Qty: 120 | Refills: 0 | Status: ACTIVE | COMMUNITY
Start: 2024-02-22

## 2024-07-15 NOTE — BRIEF OPERATIVE NOTE - ASSISTANT(S)
How Severe Is Your Skin Lesion?: mild Berta Is This A New Presentation, Or A Follow-Up?: Skin Lesions

## 2024-09-13 NOTE — ED ADULT NURSE NOTE - CHIEF COMPLAINT
Chief Complaint   Patient presents with    Recheck Medication     Refill citalopram, she was prescribed oxycodone which causes vomitting     Pre-visit Screening:  Immunizations:  up to date  Colonoscopy:  NA  Mammogram: NA  Asthma Action Test/Plan:  NA  PHQ9:  NA  GAD7:  NA  Questioned patient about current smoking habits Pt. quit smoking some time ago.  Ok to leave detailed message on voice mail for today's visit only Yes, phone # [phone]     The patient is a 73y Male complaining of

## 2024-10-01 NOTE — ASU DISCHARGE PLAN (ADULT/PEDIATRIC) - ASU DC SPECIAL INSTRUCTIONSFT
Scribe Attestation (For Scribes USE Only)... No heavy lifting for at least 2 weeks.  Follow up with Dr. Francis in 2 weeks.  You may shower 3/19. Allow soap and water to wash over incisions, and pat dry. Do not peel off dermabond as it will fall off on its own.  Take tylenol and ibuprofen for pain control, and take prescribed pain medication as needed. Attending Attestation (For Attendings USE Only).../Scribe Attestation (For Scribes USE Only)...

## 2024-10-08 ENCOUNTER — APPOINTMENT (OUTPATIENT)
Dept: INTERNAL MEDICINE | Facility: CLINIC | Age: 74
End: 2024-10-08
Payer: COMMERCIAL

## 2024-10-08 VITALS
HEIGHT: 69 IN | SYSTOLIC BLOOD PRESSURE: 120 MMHG | DIASTOLIC BLOOD PRESSURE: 72 MMHG | BODY MASS INDEX: 27.4 KG/M2 | WEIGHT: 185 LBS

## 2024-10-08 DIAGNOSIS — I25.10 ATHEROSCLEROTIC HEART DISEASE OF NATIVE CORONARY ARTERY W/OUT ANGINA PECTORIS: ICD-10-CM

## 2024-10-08 DIAGNOSIS — K46.9 UNSPECIFIED ABDOMINAL HERNIA W/OUT OBSTRUCTION OR GANGRENE: ICD-10-CM

## 2024-10-08 DIAGNOSIS — E03.9 HYPOTHYROIDISM, UNSPECIFIED: ICD-10-CM

## 2024-10-08 PROCEDURE — G2211 COMPLEX E/M VISIT ADD ON: CPT | Mod: NC

## 2024-10-08 PROCEDURE — 99214 OFFICE O/P EST MOD 30 MIN: CPT

## 2024-10-09 ENCOUNTER — OUTPATIENT (OUTPATIENT)
Dept: OUTPATIENT SERVICES | Facility: HOSPITAL | Age: 74
LOS: 1 days | End: 2024-10-09

## 2024-10-09 VITALS
DIASTOLIC BLOOD PRESSURE: 85 MMHG | HEIGHT: 69 IN | RESPIRATION RATE: 16 BRPM | TEMPERATURE: 98 F | OXYGEN SATURATION: 100 % | SYSTOLIC BLOOD PRESSURE: 135 MMHG | HEART RATE: 71 BPM | WEIGHT: 195.11 LBS

## 2024-10-09 DIAGNOSIS — Z98.890 OTHER SPECIFIED POSTPROCEDURAL STATES: Chronic | ICD-10-CM

## 2024-10-09 DIAGNOSIS — D35.2 BENIGN NEOPLASM OF PITUITARY GLAND: ICD-10-CM

## 2024-10-09 DIAGNOSIS — K43.2 INCISIONAL HERNIA WITHOUT OBSTRUCTION OR GANGRENE: ICD-10-CM

## 2024-10-09 DIAGNOSIS — Z95.5 PRESENCE OF CORONARY ANGIOPLASTY IMPLANT AND GRAFT: Chronic | ICD-10-CM

## 2024-10-09 DIAGNOSIS — I10 ESSENTIAL (PRIMARY) HYPERTENSION: ICD-10-CM

## 2024-10-09 DIAGNOSIS — Z98.89 OTHER SPECIFIED POSTPROCEDURAL STATES: Chronic | ICD-10-CM

## 2024-10-09 RX ORDER — TESTOSTERONE 16.2 MG/G
1 GEL TRANSDERMAL
Refills: 0 | DISCHARGE

## 2024-10-09 RX ORDER — FINASTERIDE 5 MG/1
1 TABLET, FILM COATED ORAL
Refills: 0 | DISCHARGE

## 2024-10-09 RX ORDER — ALENDRONATE SODIUM 70 MG/1
1 TABLET ORAL
Refills: 0 | DISCHARGE

## 2024-10-09 NOTE — H&P PST ADULT - PROBLEM SELECTOR PLAN 1
Pt is scheduled for incisional hernia repair on 10/14/24.  Verbal and written pre op instructions reviewed with patient and pt able to verbalize understanding.   Verbal and written instructions given with chlorhexidine wash, pt able to verbalize understanding via teach back method.

## 2024-10-09 NOTE — H&P PST ADULT - NS MD HP INPLANTS MED DEV
58y Female w/ HTN, HLD, Anxiety, Depression,  h/o Breast Cancer w/ TRAM flap reconstruction admitted for expanding right rectus hematoma complicated by right MCA infarction now s/p hemicraniectomy POD #3
cardiac stents x2

## 2024-10-09 NOTE — H&P PST ADULT - NSICDXPASTSURGICALHX_GEN_ALL_CORE_FT
PAST SURGICAL HISTORY:  H/O foot surgery R    H/O heart artery stent     H/O inguinal hernia repair     History of appendectomy     S/P repair of hydrocele     S/P repair of inguinal hernia

## 2024-10-09 NOTE — H&P PST ADULT - HISTORY OF PRESENT ILLNESS
73 yo male with preop dx. incisional hernia presents to pre surgical testing.  Pt s/p appendectomy 3/2024 now c/o incisional hernia x1 month.  Pt is scheduled for incisional hernia repair.

## 2024-10-09 NOTE — H&P PST ADULT - NSANTHOSAYNRD_GEN_A_CORE
No. DILCIA screening performed.  STOP BANG Legend: 0-2 = LOW Risk; 3-4 = INTERMEDIATE Risk; 5-8 = HIGH Risk

## 2024-10-09 NOTE — H&P PST ADULT - ENDOCRINE COMMENTS
h/o pituitary adenoma dx 2009, stable on Cabergoline.  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

## 2024-10-09 NOTE — H&P PST ADULT - NSICDXPASTMEDICALHX_GEN_ALL_CORE_FT
PAST MEDICAL HISTORY:  BPH (benign prostatic hypertrophy)     CAD (coronary artery disease)     GERD (gastroesophageal reflux disease)     History of osteopenia     HTN (hypertension)     Hypothyroidism     Incisional hernia     Leg edema     Peripheral neuropathy     Pituitary adenoma treated with meds    Prostatism     Stented coronary artery 2015    Testosterone deficiency     Varicose vein     Venous insufficiency

## 2024-10-09 NOTE — H&P PST ADULT - CARDIOVASCULAR COMMENTS
cardiac stents x2 2015/2016 in LAD, ASA d/c'ed about 1 year ago by cardiology per pt chronic VILLA for 15 years

## 2024-10-14 ENCOUNTER — TRANSCRIPTION ENCOUNTER (OUTPATIENT)
Age: 74
End: 2024-10-14

## 2024-10-14 ENCOUNTER — OUTPATIENT (OUTPATIENT)
Dept: INPATIENT UNIT | Facility: HOSPITAL | Age: 74
LOS: 1 days | Discharge: HOME | End: 2024-10-14
Payer: COMMERCIAL

## 2024-10-14 VITALS
TEMPERATURE: 97 F | HEIGHT: 69 IN | OXYGEN SATURATION: 100 % | SYSTOLIC BLOOD PRESSURE: 129 MMHG | WEIGHT: 195.11 LBS | DIASTOLIC BLOOD PRESSURE: 73 MMHG | RESPIRATION RATE: 15 BRPM | HEART RATE: 70 BPM

## 2024-10-14 VITALS
HEART RATE: 76 BPM | SYSTOLIC BLOOD PRESSURE: 119 MMHG | OXYGEN SATURATION: 98 % | RESPIRATION RATE: 18 BRPM | DIASTOLIC BLOOD PRESSURE: 81 MMHG

## 2024-10-14 DIAGNOSIS — Z98.890 OTHER SPECIFIED POSTPROCEDURAL STATES: Chronic | ICD-10-CM

## 2024-10-14 DIAGNOSIS — Z98.89 OTHER SPECIFIED POSTPROCEDURAL STATES: Chronic | ICD-10-CM

## 2024-10-14 DIAGNOSIS — Z95.5 PRESENCE OF CORONARY ANGIOPLASTY IMPLANT AND GRAFT: Chronic | ICD-10-CM

## 2024-10-14 PROCEDURE — 88302 TISSUE EXAM BY PATHOLOGIST: CPT | Mod: 26

## 2024-10-14 RX ORDER — OXYCODONE HYDROCHLORIDE 30 MG/1
1 TABLET, FILM COATED, EXTENDED RELEASE ORAL
Qty: 5 | Refills: 0
Start: 2024-10-14

## 2024-10-14 RX ORDER — OXYCODONE HYDROCHLORIDE 30 MG/1
5 TABLET, FILM COATED, EXTENDED RELEASE ORAL EVERY 6 HOURS
Refills: 0 | Status: DISCONTINUED | OUTPATIENT
Start: 2024-10-14 | End: 2024-10-14

## 2024-10-14 RX ORDER — FENTANYL CITRATE-0.9 % NACL/PF 300MCG/30
25 PATIENT CONTROLLED ANALGESIA VIAL INJECTION
Refills: 0 | Status: DISCONTINUED | OUTPATIENT
Start: 2024-10-14 | End: 2024-10-14

## 2024-10-14 RX ORDER — ACETAMINOPHEN 325 MG
975 TABLET ORAL EVERY 6 HOURS
Refills: 0 | Status: DISCONTINUED | OUTPATIENT
Start: 2024-10-14 | End: 2024-10-28

## 2024-10-14 RX ORDER — SODIUM CHLORIDE IRRIG SOLUTION 0.9 %
1000 SOLUTION, IRRIGATION IRRIGATION
Refills: 0 | Status: DISCONTINUED | OUTPATIENT
Start: 2024-10-14 | End: 2024-10-28

## 2024-10-14 RX ORDER — OXYCODONE HYDROCHLORIDE 30 MG/1
2.5 TABLET, FILM COATED, EXTENDED RELEASE ORAL EVERY 6 HOURS
Refills: 0 | Status: DISCONTINUED | OUTPATIENT
Start: 2024-10-14 | End: 2024-10-14

## 2024-10-14 RX ORDER — ONDANSETRON HCL/PF 4 MG/2 ML
4 VIAL (ML) INJECTION ONCE
Refills: 0 | Status: DISCONTINUED | OUTPATIENT
Start: 2024-10-14 | End: 2024-10-28

## 2024-10-14 RX ADMIN — OXYCODONE HYDROCHLORIDE 5 MILLIGRAM(S): 30 TABLET, FILM COATED, EXTENDED RELEASE ORAL at 12:25

## 2024-10-14 RX ADMIN — Medication 75 MILLILITER(S): at 12:24

## 2024-10-14 RX ADMIN — OXYCODONE HYDROCHLORIDE 5 MILLIGRAM(S): 30 TABLET, FILM COATED, EXTENDED RELEASE ORAL at 13:10

## 2024-10-14 NOTE — ASU DISCHARGE PLAN (ADULT/PEDIATRIC) - ASU DC SPECIAL INSTRUCTIONSFT
WOUND CARE:  Please keep incisions clean and dry. Please do not Scrub or rub incisions. Do not use lotion or powder on incisions. You can remove the gauze in the umbilicus and overlying tegaderm in 3 days. Do not remove the steri strips, they will fall off on their own over time.    BATHING: You may shower and/or sponge bathe. You may use warm soapy water in the shower and rinse, pat dry.     PAIN: You may take over-the-counter medications for pain. You make take Tylenol orally every 6 hours as needed. Do not exceed 4,000mg a day. You may take ibuprofen every 6 hours. You may alternate between the two for better pain control. You have been prescribed narcotics for pain management. Please take as prescribed on pill bottle, AS NEEDED, for BREAKTHROUGH pain ONLY. If you are taking narcotic pain medication DO NOT drive a car, operate machinery or make important decisions.    MEDICATIONS: Please continue home medications as prescribed.    ACTIVITY: No heavy lifting or straining for 6 weeks. Otherwise, you may return to your usual level of physical activity.     DIET: Return to your usual diet.      NOTIFY YOUR SURGEON IF YOU HAVE: any bleeding that does not stop, any pus draining from your wound(s), any fever (over 100.4 F) persistent nausea/vomiting, inability to urinate, or if your pain is not controlled on your discharge pain medications.     FOLLOW UP:  Please follow up with your surgeon Dr. Murphy in 1-2 weeks after discharge. Please call the office to schedule the appointment or if you have any questions.

## 2024-10-14 NOTE — ASU DISCHARGE PLAN (ADULT/PEDIATRIC) - NURSING INSTRUCTIONS
DO NOT take any Tylenol (Acetaminophen) or narcotics containing Tylenol until after 540pm . You received Tylenol during your operation and it can cause damage to your liver if too much is taken within a 24 hour time period.

## 2024-10-14 NOTE — ASU DISCHARGE PLAN (ADULT/PEDIATRIC) - NS MD DC FALL RISK RISK
For information on Fall & Injury Prevention, visit: [email or web address] OR  [email or web address] OR  https://www.cdc.gov/steadi/patient.html

## 2024-10-14 NOTE — BRIEF OPERATIVE NOTE - NSICDXBRIEFPROCEDURE_GEN_ALL_CORE_FT
PROCEDURES:  Repair, hernia, ventral, open, using component separation technique 14-Oct-2024 11:46:20  Sadaf Gómez

## 2024-10-14 NOTE — ASU DISCHARGE PLAN (ADULT/PEDIATRIC) - CARE PROVIDER_API CALL
Mina Murphy  Surgery  [address] [facility] [address], [address]  [place] [address]  Phone: [phone]  Fax: [phone]  Follow Up Time: 2 weeks

## 2024-10-18 LAB — SURGICAL PATHOLOGY STUDY: SIGNIFICANT CHANGE UP

## 2024-11-05 PROBLEM — E34.9 ENDOCRINE DISORDER, UNSPECIFIED: Chronic | Status: ACTIVE | Noted: 2024-10-09

## 2024-11-05 PROBLEM — K43.2 INCISIONAL HERNIA WITHOUT OBSTRUCTION OR GANGRENE: Chronic | Status: ACTIVE | Noted: 2024-10-09

## 2024-11-05 PROBLEM — Z87.39 PERSONAL HISTORY OF OTHER DISEASES OF THE MUSCULOSKELETAL SYSTEM AND CONNECTIVE TISSUE: Chronic | Status: ACTIVE | Noted: 2024-10-09

## 2025-01-28 ENCOUNTER — APPOINTMENT (OUTPATIENT)
Dept: INTERNAL MEDICINE | Facility: CLINIC | Age: 75
End: 2025-01-28
Payer: COMMERCIAL

## 2025-01-28 VITALS
DIASTOLIC BLOOD PRESSURE: 86 MMHG | HEIGHT: 69 IN | SYSTOLIC BLOOD PRESSURE: 138 MMHG | BODY MASS INDEX: 27.4 KG/M2 | WEIGHT: 185 LBS

## 2025-01-28 DIAGNOSIS — I10 ESSENTIAL (PRIMARY) HYPERTENSION: ICD-10-CM

## 2025-01-28 DIAGNOSIS — I25.10 ATHEROSCLEROTIC HEART DISEASE OF NATIVE CORONARY ARTERY W/OUT ANGINA PECTORIS: ICD-10-CM

## 2025-01-28 DIAGNOSIS — E03.9 HYPOTHYROIDISM, UNSPECIFIED: ICD-10-CM

## 2025-01-28 PROCEDURE — G2211 COMPLEX E/M VISIT ADD ON: CPT | Mod: NC

## 2025-01-28 PROCEDURE — 99214 OFFICE O/P EST MOD 30 MIN: CPT

## 2025-07-28 NOTE — H&P PST ADULT - LAST STRESS TEST
Patient discharged per MD orders. Instructions given on medications, activity, when to call MD, and follow up appointments. Pt and patient's daughter verbalized understanding.  Patient AAOx4, VSS, no c/o pain or discomfort at this time. Telemetry and PIV removed. Bardy monitor intact to mid anterior chest. Patient left unit via wheelchair with his daughter.    2016

## 2025-08-26 ENCOUNTER — APPOINTMENT (OUTPATIENT)
Dept: INTERNAL MEDICINE | Facility: CLINIC | Age: 75
End: 2025-08-26
Payer: COMMERCIAL

## 2025-08-26 VITALS
HEART RATE: 70 BPM | OXYGEN SATURATION: 100 % | BODY MASS INDEX: 27.89 KG/M2 | SYSTOLIC BLOOD PRESSURE: 120 MMHG | DIASTOLIC BLOOD PRESSURE: 78 MMHG | WEIGHT: 184 LBS | HEIGHT: 68 IN

## 2025-08-26 DIAGNOSIS — E78.00 PURE HYPERCHOLESTEROLEMIA, UNSPECIFIED: ICD-10-CM

## 2025-08-26 DIAGNOSIS — I10 ESSENTIAL (PRIMARY) HYPERTENSION: ICD-10-CM

## 2025-08-26 DIAGNOSIS — D35.2 BENIGN NEOPLASM OF PITUITARY GLAND: ICD-10-CM

## 2025-08-26 DIAGNOSIS — Z00.00 ENCOUNTER FOR GENERAL ADULT MEDICAL EXAMINATION W/OUT ABNORMAL FINDINGS: ICD-10-CM

## 2025-08-26 DIAGNOSIS — I25.10 ATHEROSCLEROTIC HEART DISEASE OF NATIVE CORONARY ARTERY W/OUT ANGINA PECTORIS: ICD-10-CM

## 2025-08-26 DIAGNOSIS — E03.9 HYPOTHYROIDISM, UNSPECIFIED: ICD-10-CM

## 2025-08-26 PROCEDURE — 93000 ELECTROCARDIOGRAM COMPLETE: CPT

## 2025-08-26 PROCEDURE — 99397 PER PM REEVAL EST PAT 65+ YR: CPT

## (undated) DEVICE — GLV 7 PROTEXIS (WHITE)

## (undated) DEVICE — SUT VICRYL 0 27" UR-6

## (undated) DEVICE — PACK MINOR WITH LAP

## (undated) DEVICE — SUT VICRYL 2-0 27" SH UNDYED

## (undated) DEVICE — SUT PROLENE 1 30" CT-1

## (undated) DEVICE — ELCTR GROUNDING PAD ADULT COVIDIEN

## (undated) DEVICE — GLV 7.5 PROTEXIS (WHITE)

## (undated) DEVICE — SUT PROLENE 0 30" CT-1

## (undated) DEVICE — BLADE SURGICAL #15 CARBON

## (undated) DEVICE — LIGASURE MARYLAND 37CM

## (undated) DEVICE — PACK MINOR NO DRAPE

## (undated) DEVICE — DRAPE LAPAROTOMY TRANSVERSE

## (undated) DEVICE — SUT VICRYL 2-0 18" TIES UNDYED

## (undated) DEVICE — BASIN SET DOUBLE

## (undated) DEVICE — SUT VICRYL PLUS 2-0 18" TIES UNDYED

## (undated) DEVICE — DRSG STERISTRIPS 0.5 X 4"

## (undated) DEVICE — SPONGE DISSECTOR PEANUT

## (undated) DEVICE — TROCAR APPLIED MEDICAL KII BALLOON BLUNT TIP 12MM X 100MM

## (undated) DEVICE — VENODYNE/SCD SLEEVE CALF MEDIUM

## (undated) DEVICE — TROCAR COVIDIEN BLUNT TIP HASSAN 10MM STANDARD

## (undated) DEVICE — SUT MONOCRYL 4-0 27" PS-2 UNDYED

## (undated) DEVICE — TROCAR COVIDIEN ENDO CLOSE SUTURING DEVICE

## (undated) DEVICE — STAPLER COVIDIEN ENDO GIA STANDARD HANDLE

## (undated) DEVICE — BASIN SET SINGLE

## (undated) DEVICE — GOWN LG

## (undated) DEVICE — DRSG BENZOIN 0.6CC

## (undated) DEVICE — SHEARS COVIDIEN ENDO SHEAR 5MM X 31CM W UNIPOLAR CAUTERY

## (undated) DEVICE — POSITIONER FOAM EGG CRATE ULNAR 2PCS (PINK)

## (undated) DEVICE — TUBING OLYMPUS INSUFFLATION

## (undated) DEVICE — ANESTHESIA CIRCUIT ADULT HMEF

## (undated) DEVICE — GOWN XL

## (undated) DEVICE — PACK GENERAL LAPAROSCOPY

## (undated) DEVICE — ELCTR BOVIE PENCIL SMOKE EVACUATION

## (undated) DEVICE — TROCAR COVIDIEN VERSAPORT BLADELESS OPTICAL 5MM STANDARD

## (undated) DEVICE — NDL HYPO SAFE 22G X 1.5" (BLACK)

## (undated) DEVICE — SOL IRR POUR NS 0.9% 500ML

## (undated) DEVICE — WARMING BLANKET FULL ADULT

## (undated) DEVICE — LIGASURE SMALL JAW

## (undated) DEVICE — TROCAR COVIDIEN VERSAONE FIXATION CANNULA 5MM

## (undated) DEVICE — WARMING BLANKET FULL UNDERBODY

## (undated) DEVICE — SOL INJ NS 0.9% 1000ML

## (undated) DEVICE — ENDOCATCH 10MM SPECIMEN POUCH

## (undated) DEVICE — SYR LUER LOK 20CC

## (undated) DEVICE — GLV 7.5 PROTEXIS (CREAM) MICRO

## (undated) DEVICE — ELCTR ROCKER SWITCH PENCIL BLUE 10FT

## (undated) DEVICE — DRAIN PENROSE .5" X 18" LATEX

## (undated) DEVICE — POSITIONER STRAP ARMBOARD VELCRO TS-30

## (undated) DEVICE — DRSG DERMABOND 0.7ML

## (undated) DEVICE — TROCAR COVIDIEN BLUNT TIP HASSAN 12MM

## (undated) DEVICE — SUT VICRYL 3-0 27" SH UNDYED

## (undated) DEVICE — TAPE SILK 3"

## (undated) DEVICE — TUBING INSUFFLATION LAP FILTER 10FT

## (undated) DEVICE — SOL IRR POUR H2O 500ML

## (undated) DEVICE — DRAPE TOWEL BLUE 17" X 24"